# Patient Record
Sex: FEMALE | Race: WHITE | NOT HISPANIC OR LATINO | Employment: UNEMPLOYED | URBAN - METROPOLITAN AREA
[De-identification: names, ages, dates, MRNs, and addresses within clinical notes are randomized per-mention and may not be internally consistent; named-entity substitution may affect disease eponyms.]

---

## 2017-03-15 ENCOUNTER — TRANSFERRED RECORDS (OUTPATIENT)
Dept: HEALTH INFORMATION MANAGEMENT | Facility: CLINIC | Age: 42
End: 2017-03-15

## 2017-03-15 LAB — PAP-ABSTRACT: NORMAL

## 2017-04-03 NOTE — PROGRESS NOTES
SUBJECTIVE:                                                    Rachael Elder is a 42 year old female who presents to clinic today for the following health issues:       HPI      Depression and Anxiety Follow-Up    Status since last visit: Improved     Other associated symptoms:None    Complicating factors:     Significant life event: No     Current substance abuse: None    PHQ-9 SCORE 10/3/2012 4/7/2017   Total Score 5 -   Total Score - 1     ELISA-7 SCORE 10/3/2012 6/29/2015 4/7/2017   Total Score 5 2 -   Total Score - - 2        PHQ-9  English      PHQ-9   Any Language     GAD7       Problem list and histories reviewed & adjusted, as indicated.  Additional history: as documented        Patient Active Problem List   Diagnosis     Phobia, flying     Abnormal LFTs     Past Surgical History:   Procedure Laterality Date     LITHOTRIPSY  4/2009    LASER--Renal stone treaatment -- ?right       Social History   Substance Use Topics     Smoking status: Never Smoker     Smokeless tobacco: Never Used     Alcohol use No     Family History   Problem Relation Age of Onset     DIABETES Mother      Lipids Mother      CANCER Mother      Lung     Coronary Artery Disease No family hx of      Hyperlipidemia No family hx of      Breast Cancer No family hx of      Other Cancer No family hx of      Substance Abuse No family hx of      OSTEOPOROSIS No family hx of      Depression No family hx of      MENTAL ILLNESS No family hx of      Colon Cancer No family hx of      Prostate Cancer No family hx of      CEREBROVASCULAR DISEASE No family hx of      Hypertension No family hx of          BP Readings from Last 3 Encounters:   04/07/17 98/56   10/12/16 100/60   10/06/16 111/71    Wt Readings from Last 3 Encounters:   04/07/17 102 lb (46.3 kg)   10/12/16 101 lb 3.2 oz (45.9 kg)   10/06/16 100 lb (45.4 kg)                  Labs reviewed in EPIC    ROS:  Constitutional, HEENT, cardiovascular, pulmonary, gi and gu systems are negative, except  "as otherwise noted.    OBJECTIVE:                                                    BP 98/56 (BP Location: Left arm, Patient Position: Chair, Cuff Size: Adult Regular)  Pulse 62  Temp 98.2  F (36.8  C) (Oral)  Resp 16  Ht 5' 0.25\" (1.53 m)  Wt 102 lb (46.3 kg)  LMP 04/05/2017 (Approximate)  BMI 19.76 kg/m2  Body mass index is 19.76 kg/(m^2).  GENERAL: healthy, alert and no distress  NECK: no adenopathy, no asymmetry, masses, or scars and thyroid normal to palpation  RESP: lungs clear to auscultation - no rales, rhonchi or wheezes  CV: regular rate and rhythm, normal S1 S2, no S3 or S4, no murmur, click or rub, no peripheral edema and peripheral pulses strong  ABDOMEN: soft, nontender, no hepatosplenomegaly, no masses and bowel sounds normal  MS: no gross musculoskeletal defects noted, no edema  PSYCH: mentation appears normal, affect normal/bright    Diagnostic Test Results:  none      ASSESSMENT/PLAN:                                                      1. Phobia, flying  Planing trip with family that includes flying. Using Klonopin as need for this her last rx outdated.   - clonazePAM (KLONOPIN) 0.5 MG tablet; Take 1 tablet (0.5 mg) by mouth 2 times daily as needed for anxiety  Dispense: 10 tablet; Refill: 0    Discussed with patient that many appointments like this one could be done as an evisit or telephone visit.         See Patient Instructions    ISAAC Peralta [facility]  "

## 2017-04-07 ENCOUNTER — OFFICE VISIT (OUTPATIENT)
Dept: FAMILY MEDICINE | Facility: OTHER | Age: 42
End: 2017-04-07
Payer: COMMERCIAL

## 2017-04-07 VITALS
WEIGHT: 102 LBS | RESPIRATION RATE: 16 BRPM | TEMPERATURE: 98.2 F | BODY MASS INDEX: 20.03 KG/M2 | DIASTOLIC BLOOD PRESSURE: 56 MMHG | SYSTOLIC BLOOD PRESSURE: 98 MMHG | HEIGHT: 60 IN | HEART RATE: 62 BPM

## 2017-04-07 DIAGNOSIS — F40.243 PHOBIA, FLYING: ICD-10-CM

## 2017-04-07 PROCEDURE — 99213 OFFICE O/P EST LOW 20 MIN: CPT | Performed by: NURSE PRACTITIONER

## 2017-04-07 RX ORDER — CLONAZEPAM 0.5 MG/1
0.5 TABLET ORAL 2 TIMES DAILY PRN
Qty: 10 TABLET | Refills: 0 | Status: SHIPPED | OUTPATIENT
Start: 2017-04-07 | End: 2019-02-25

## 2017-04-07 ASSESSMENT — ANXIETY QUESTIONNAIRES
IF YOU CHECKED OFF ANY PROBLEMS ON THIS QUESTIONNAIRE, HOW DIFFICULT HAVE THESE PROBLEMS MADE IT FOR YOU TO DO YOUR WORK, TAKE CARE OF THINGS AT HOME, OR GET ALONG WITH OTHER PEOPLE: NOT DIFFICULT AT ALL
2. NOT BEING ABLE TO STOP OR CONTROL WORRYING: NOT AT ALL
7. FEELING AFRAID AS IF SOMETHING AWFUL MIGHT HAPPEN: NOT AT ALL
6. BECOMING EASILY ANNOYED OR IRRITABLE: NOT AT ALL
3. WORRYING TOO MUCH ABOUT DIFFERENT THINGS: SEVERAL DAYS
GAD7 TOTAL SCORE: 2
5. BEING SO RESTLESS THAT IT IS HARD TO SIT STILL: NOT AT ALL
1. FEELING NERVOUS, ANXIOUS, OR ON EDGE: NOT AT ALL

## 2017-04-07 ASSESSMENT — PATIENT HEALTH QUESTIONNAIRE - PHQ9: 5. POOR APPETITE OR OVEREATING: SEVERAL DAYS

## 2017-04-07 ASSESSMENT — PAIN SCALES - GENERAL: PAINLEVEL: NO PAIN (0)

## 2017-04-07 NOTE — MR AVS SNAPSHOT
After Visit Summary   4/7/2017    Rachael Elder    MRN: [id number]           Patient Information     Date Of Birth          1975        Visit Information        Provider Department      4/7/2017 8:00 AM Lesley Magdaleno APRN CNP [facility]        Today's Diagnoses     Phobia, flying          Care Instructions    Just fyi you can do evisits or telephone visits as well by calling clinic and telling them you would like telephone visit or using My Chart.     Thank you  Lesley Magdaleno CNP          Follow-ups after your visit        Who to contact     If you have questions or need follow up information about today's clinic visit or your schedule please contact [facility] directly at [phone].  Normal or non-critical lab and imaging results will be communicated to you by MyChart, letter or phone within 4 business days after the clinic has received the results. If you do not hear from us within 7 days, please contact the clinic through MyChart or phone. If you have a critical or abnormal lab result, we will notify you by phone as soon as possible.  Submit refill requests through [employer] or call your pharmacy and they will forward the refill request to us. Please allow 3 business days for your refill to be completed.          Additional Information About Your Visit        MyChart Information     [employer] gives you secure access to your electronic health record. If you see a primary care provider, you can also send messages to your care team and make appointments. If you have questions, please call your primary care clinic.  If you do not have a primary care provider, please call [phone] and they will assist you.        Care EveryWhere ID     This is your Care EveryWhere ID. This could be used by other organizations to access your [place] medical records  [id number]        Your Vitals Were     Pulse Temperature Respirations Height Last Period BMI (Body  "Mass Index)    62 98.2  F (36.8  C) (Oral) 16 5' 0.25\" (1.53 m) 04/05/2017 (Approximate) 19.76 kg/m2       Blood Pressure from Last 3 Encounters:   04/07/17 98/56   10/12/16 100/60   10/06/16 111/71    Weight from Last 3 Encounters:   04/07/17 102 lb (46.3 kg)   10/12/16 101 lb 3.2 oz (45.9 kg)   10/06/16 100 lb (45.4 kg)              Today, you had the following     No orders found for display         Where to get your medicines      Some of these will need a paper prescription and others can be bought over the counter.  Ask your nurse if you have questions.     Bring a paper prescription for each of these medications     clonazePAM 0.5 MG tablet          Primary Care Provider Office Phone # Fax #    Shanika ISAAC العراقي [facility] [phone]       [address]  [facility] [address]        Thank you!     Thank you for choosing [facility]  for your care. Our goal is always to provide you with excellent care. Hearing back from our patients is one way we can continue to improve our services. Please take a few minutes to complete the written survey that you may receive in the mail after your visit with us. Thank you!             Your Updated Medication List - Protect others around you: Learn how to safely use, store and throw away your medicines at www.disposemymeds.org.          This list is accurate as of: 4/7/17  8:32 AM.  Always use your most recent med list.                   Brand Name Dispense Instructions for use    clonazePAM 0.5 MG tablet    klonoPIN    10 tablet    Take 1 tablet (0.5 mg) by mouth 2 times daily as needed for anxiety       MULTI-DAY VITAMINS PO      1 TABLET DAILY       PROBIOTIC PO      Take  by mouth daily.       TYLENOL 325 MG tablet   Generic drug:  acetaminophen      Take 1-2 tablets by mouth every 6 hours as needed.         "

## 2017-04-07 NOTE — PATIENT INSTRUCTIONS
Just fyi you can do evisits or telephone visits as well by calling clinic and telling them you would like telephone visit or using My Chart.     Thank you  Lesley Magdaleno CNP

## 2017-04-07 NOTE — NURSING NOTE
"Chief Complaint   Patient presents with     Anxiety     Panel Management     height, tdap       Initial BP 98/56 (BP Location: Left arm, Patient Position: Chair, Cuff Size: Adult Regular)  Pulse 62  Temp 98.2  F (36.8  C) (Oral)  Resp 16  Ht 5' 0.25\" (1.53 m)  Wt 102 lb (46.3 kg)  LMP 04/05/2017 (Approximate)  BMI 19.76 kg/m2 Estimated body mass index is 19.76 kg/(m^2) as calculated from the following:    Height as of this encounter: 5' 0.25\" (1.53 m).    Weight as of this encounter: 102 lb (46.3 kg).  Medication Reconciliation: complete    "

## 2017-04-08 ASSESSMENT — ANXIETY QUESTIONNAIRES: GAD7 TOTAL SCORE: 2

## 2017-04-08 ASSESSMENT — PATIENT HEALTH QUESTIONNAIRE - PHQ9: SUM OF ALL RESPONSES TO PHQ QUESTIONS 1-9: 1

## 2018-01-29 NOTE — PATIENT INSTRUCTIONS
Preventive Health Recommendations  Female Ages 40 to 49    Yearly exam:     See your health care provider every year in order to  1. Review health changes.   2. Discuss preventive care.    3. Review your medicines if your doctor prescribed any.      Get a Pap test every three years (unless you have an abnormal result and your provider advises testing more often).      If you get Pap tests with HPV test, you only need to test every 5 years, unless you have an abnormal result. You do not need a Pap test if your uterus was removed (hysterectomy) and you have not had cancer.      You should be tested each year for STDs (sexually transmitted diseases), if you're at risk.       Ask your doctor if you should have a mammogram.      Have a colonoscopy (test for colon cancer) if someone in your family has had colon cancer or polyps before age 50.       Have a cholesterol test every 5 years.       Have a diabetes test (fasting glucose) after age 45. If you are at risk for diabetes, you should have this test every 3 years.    Shots: Get a flu shot each year. Get a tetanus shot every 10 years.     Nutrition:     Eat at least 5 servings of fruits and vegetables each day.    Eat whole-grain bread, whole-wheat pasta and brown rice instead of white grains and rice.    Talk to your provider about Calcium and Vitamin D.     Lifestyle    Exercise at least 150 minutes a week (an average of 30 minutes a day, 5 days a week). This will help you control your weight and prevent disease.    Limit alcohol to one drink per day.    No smoking.     Wear sunscreen to prevent skin cancer.    See your dentist every six months for an exam and cleaning.    Thank you  Lesley Magdaleno CNP    Understanding Seborrheic Keratosis  Seborrheic keratoses are wart-like growths on the skin. These growths are not cancer. Many people get them, especially after age 30.  How to say it  [id number]   What causes seborrheic keratoses?  Doctors do not  know what causes seborrheic keratoses. They may run in families. In addition, they become more common as people get older.  What are the symptoms of seborrheic keratoses?  Here is what seborrheic keratoses often look like:    They tend to be round or oval in shape. They can be very small or about as big across as a quarter.    They can appear singly or in clusters.    They tend to be tan, brown, or black in color.    The edges may be scalloped or uneven-looking.    They can have a waxy or scaly look.    They can be a bit raised, appearing to be  stuck on  the skin.    They may become red and irritated if scratched or rubbed by clothing  Sebhorreic keratoses are not painful, but they may be itchy. They can become irritated if they are continually rubbed by skin or clothing. Seborrheic keratoses most often appear on the face, arms, chest, back, or belly.  How are seborrheic keratoses treated?  Seborrheic keratoses don t need to be treated unless they bother you. You may choose to have them removed because you find them unattractive. You may also want them removed because they get irritated and uncomfortable. A healthcare provider can remove them in an office visit. Ways that seborrheic keratoses can be removed include:    Freezing them off with liquid nitrogen    Cutting them off with a scalpel    Burning them off with electricity  What are the complications of seborrheic keratoses?  Seborrheic keratoses are not cancer, but they can look like some types of skin cancer. So it s a good idea to ask your healthcare provider to check any new skin growths. Ask your healthcare provider about any skin problem that concerns you.  When should I call my healthcare provider?  Call your healthcare provider right away if any of these occur:    You develop a lot of seborrheic keratoses very quickly    You have a sore that does not heal within a few weeks, or heals and then comes back    You have a mole or skin growth that is  changing in size, shape, or color    You have a mole or skin growth that looks different on one side from the other    You have a mole or skin growth that is not the same color throughout   Date Last Reviewed: 5/1/2016 2000-2017 [employer]. [address] [place] [id number]. All rights reserved. This information is not intended as a substitute for professional medical care. Always follow your healthcare professional's instructions.

## 2018-01-29 NOTE — PROGRESS NOTES
SUBJECTIVE:   CC: Rachael Elder is an 42 year old woman who presents for preventive health visit.     Physical   Annual:     Getting at least 3 servings of Calcium per day::  Yes    Bi-annual eye exam::  Yes    Dental care twice a year::  Yes    Sleep apnea or symptoms of sleep apnea::  None    Diet::  Regular (no restrictions)    Frequency of exercise::  2-3 days/week    Duration of exercise::  15-30 minutes    Taking medications regularly::  Yes    Medication side effects::  Other    Additional concerns today::  YES (check mole on back )            Today's PHQ-2 Score:   PHQ-2 ( 1999 [employer]) 2/4/2018   Q1: Little interest or pleasure in doing things 0   Q2: Feeling down, depressed or hopeless 0   PHQ-2 Score 0   Q1: Little interest or pleasure in doing things Not at all   Q2: Feeling down, depressed or hopeless Not at all   PHQ-2 Score 0       Abuse: Current or Past(Physical, Sexual or Emotional)- No  Do you feel safe in your environment - Yes    Social History   Substance Use Topics     Smoking status: Never Smoker     Smokeless tobacco: Never Used     Alcohol use No     Alcohol Use 2/4/2018   If you drink alcohol, do you typically have greater than 3 drinks per day OR greater than 7 drinks per week?   No       Reviewed orders with patient.  Reviewed health maintenance and updated orders accordingly - Yes  Labs reviewed in EPIC  BP Readings from Last 3 Encounters:   02/05/18 108/62   04/07/17 98/56   10/12/16 100/60    Wt Readings from Last 3 Encounters:   02/05/18 105 lb (47.6 kg)   04/07/17 102 lb (46.3 kg)   10/12/16 101 lb 3.2 oz (45.9 kg)                  Patient Active Problem List   Diagnosis     Phobia, flying     Abnormal LFTs     Past Surgical History:   Procedure Laterality Date     LITHOTRIPSY  4/2009    LASER--Renal stone treaatment -- ?right       Social History   Substance Use Topics     Smoking status: Never Smoker     Smokeless tobacco: Never Used     Alcohol use No     Family History    Problem Relation Age of Onset     DIABETES Mother      Lipids Mother      CANCER Mother      Lung     Coronary Artery Disease No family hx of      Hyperlipidemia No family hx of      Breast Cancer No family hx of      Other Cancer No family hx of      Substance Abuse No family hx of      OSTEOPOROSIS No family hx of      Depression No family hx of      MENTAL ILLNESS No family hx of      Colon Cancer No family hx of      Prostate Cancer No family hx of      CEREBROVASCULAR DISEASE No family hx of      Hypertension No family hx of          Allergies   Allergen Reactions     Codeine Nausea and Vomiting     Macrobid [Nitrofurantoin Monohydrate Macrocrystals]      Rash, Hives     Penicillin [Penicillins]      Rash     Recent Labs   Lab Test  10/12/16   0921  10/06/16   0514  10/05/16   1820   ALT  176*  549*  630*   CR  0.53  0.49*   --    GFRESTIMATED  >90  Non  GFR Calc    >90  Non  GFR Calc     --    GFRESTBLACK  >90   GFR Calc    >90   GFR Calc     --    POTASSIUM  4.2  4.2   --         Patient under age 50, mutual decision reflected in health maintenance.      Pertinent mammograms are reviewed under the imaging tab.    #[id number] - MAMMO SCREEN SHALA  BILATERAL DIGITAL SCREENING MAMMOGRAM 3D/2D WITH CAD: 3/15/2017  Comparison is made to exams dated:  2/26/2015 mammogram and 2/24/2014 mammogram - [facility].      FINDINGS: The tissue of both breasts is heterogeneously dense, which may obscure small masses.     2D and 3D mammography was performed.  Current study was also evaluated with a Computer Aided Detection (CAD) system.    There are benign calcifications right breast.    No significant masses, calcifications, or other findings are seen in either breast.    There has been no significant interval change.    IMPRESSION: BENIGN  There is no mammographic evidence of malignancy.  A 1 year screening mammogram is recommended.       Dr. Vielka HERNANDEZ  Carlitos moreno/zandra:3/15/2017 09:45:06        letter sent: Normal Letter    BI-RADS: 2 Benign    History of abnormal Pap smear: NO - age 30- 65 PAP every 3 years recommended  Last pap results 3/2017  PAP REFLEX HPV (AGE 21 OR OLDER) (03/15/2017)  PAP REFLEX HPV (AGE 21 OR OLDER) (03/15/2017)   Specimen Performing Laboratory   CERVICAL/ENDOCERVICAL [facility]    [address] [place] [address]       PAP REFLEX HPV (AGE 21 OR OLDER) (03/15/2017)   Narrative   CASE: [id number]    PATIENT: DANYEL GUPTA    TEST:  Liquid-based Pap Test with HPV Reflex    SPECIMEN: Cervical/Endocervical; Liquid Based Prep    ASSOCIATED DIAGNOSIS:  Encounter for general adult medical examination    without abnormal findings    LMP:  03/09/2017                    SPECIMEN ADEQUACY:    SATISFACTORY PREPARATION FOR CYTOLOGIC EVALUATION            CYTOLOGIC INTERPRETATION:    NEGATIVE FOR INTRAEPITHELIAL LESION OR MALIGNANCY    ...ENDOMETRIAL CELLS PRESENT IN A WOMAN > / = 40 YEARS OF AGE                                            Final Interpretation performed by    Tamika Gallagher CT(ASCP), SCT(ASCP)    Electronically signed 3/17/2017 9:07:03AM             Reviewed and updated as needed this visit by clinical staff  Tobacco  Allergies  Meds  Problems  Med Hx  Surg Hx  Fam Hx  Soc Hx          Reviewed and updated as needed this visit by Provider  Tobacco  Allergies  Meds  Problems  Med Hx  Surg Hx  Fam Hx  Soc Hx         Past Medical History:   Diagnosis Date     NO ACTIVE PROBLEMS       Past Surgical History:   Procedure Laterality Date     LITHOTRIPSY  4/2009    LASER--Renal stone treaatment -- ?right     Obstetric History     No data available          Review of Systems   Constitutional: Negative for chills and fever.   HENT: Negative for congestion, ear pain, hearing loss and sore throat.    Eyes: Negative for pain and visual disturbance.   Respiratory: Negative for cough and  shortness of breath.    Cardiovascular: Negative for chest pain, palpitations and peripheral edema.   Gastrointestinal: Negative for abdominal pain, constipation, diarrhea, heartburn, hematochezia and nausea.   Genitourinary: Negative for dysuria, frequency, genital sores, hematuria, pelvic pain, urgency, vaginal bleeding and vaginal discharge.   Musculoskeletal: Negative for arthralgias, joint swelling and myalgias.   Skin: Negative for rash.   Neurological: Negative for dizziness, weakness, headaches and paresthesias.   Psychiatric/Behavioral: Negative for mood changes. The patient is not nervous/anxious.           OBJECTIVE:   /62 (BP Location: Left arm, Patient Position: Chair, Cuff Size: Adult Regular)  Pulse 64  Temp 97.7  F (36.5  C) (Oral)  Resp 16  Ht 5' (1.524 m)  Wt 105 lb (47.6 kg)  LMP 01/23/2018 (Approximate)  BMI 20.51 kg/m2  Physical Exam   Constitutional: She is oriented to person, place, and time. She appears well-developed and well-nourished.   HENT:   Head: Normocephalic and atraumatic.   Right Ear: External ear normal.   Left Ear: External ear normal.   Nose: Nose normal.   Mouth/Throat: Oropharynx is clear and moist.   Eyes: Conjunctivae and EOM are normal. Pupils are equal, round, and reactive to light.   Neck: Normal range of motion. Neck supple.   Cardiovascular: Normal rate, regular rhythm, normal heart sounds and intact distal pulses.    Pulmonary/Chest: Effort normal and breath sounds normal.   Abdominal: Soft. Bowel sounds are normal.   Musculoskeletal: Normal range of motion.   Neurological: She is alert and oriented to person, place, and time. She has normal reflexes.   Skin: Skin is dry.   Seborrheic keratosis one spot on back near bra line.    Psychiatric: She has a normal mood and affect. Her behavior is normal. Judgment and thought content normal.         ASSESSMENT/PLAN:   1. Routine general medical examination at a health care facility  Patient states she has had  labs done through her spouses work that was normal lipid and glucose.     2. Encounter for screening mammogram for breast cancer  States she normally has screening done through [facility] last mammo as shown above she states she has history of fibrous breast tissue and therefore typically does 3D imaging  - MA Screen Bilateral w/Cristi; Future    3. Seborrheic keratoses  Arlene seborrheic keratosis and treatment to include removal via blade, cryotherapy, or she may see dermatology for other options.  discussed benign etiology.  Handout given for home.      COUNSELING:  Reviewed preventive health counseling, as reflected in patient instructions       Regular exercise       Healthy diet/nutrition       Vision screening       Osteoporosis Prevention/Bone Health         reports that she has never smoked. She has never used smokeless tobacco.    Estimated body mass index is 20.51 kg/(m^2) as calculated from the following:    Height as of this encounter: 5' (1.524 m).    Weight as of this encounter: 105 lb (47.6 kg).       Counseling Resources:  ATP IV Guidelines  Pooled Cohorts Equation Calculator  Breast Cancer Risk Calculator  FRAX Risk Assessment  ICSI Preventive Guidelines  Dietary Guidelines for Americans, 2010  USDA's MyPlate  ASA Prophylaxis  Lung CA Screening    ISAAC Peralta CNP  [facility]  Answers for HPI/ROS submitted by the patient on 2/4/2018   PHQ-2 Score: 0

## 2018-02-04 ASSESSMENT — ENCOUNTER SYMPTOMS
CONSTIPATION: 0
DYSURIA: 0
EYE PAIN: 0
MYALGIAS: 0
SHORTNESS OF BREATH: 0
JOINT SWELLING: 0
PALPITATIONS: 0
ABDOMINAL PAIN: 0
HEADACHES: 0
FREQUENCY: 0
COUGH: 0
ARTHRALGIAS: 0
SORE THROAT: 0
HEMATOCHEZIA: 0
NAUSEA: 0
DIZZINESS: 0
WEAKNESS: 0
FEVER: 0
NERVOUS/ANXIOUS: 0
DIARRHEA: 0
HEMATURIA: 0
HEARTBURN: 0
CHILLS: 0
PARESTHESIAS: 0

## 2018-02-05 ENCOUNTER — OFFICE VISIT (OUTPATIENT)
Dept: FAMILY MEDICINE | Facility: OTHER | Age: 43
End: 2018-02-05
Payer: COMMERCIAL

## 2018-02-05 VITALS
DIASTOLIC BLOOD PRESSURE: 62 MMHG | HEIGHT: 60 IN | BODY MASS INDEX: 20.62 KG/M2 | WEIGHT: 105 LBS | SYSTOLIC BLOOD PRESSURE: 108 MMHG | TEMPERATURE: 97.7 F | RESPIRATION RATE: 16 BRPM | HEART RATE: 64 BPM

## 2018-02-05 DIAGNOSIS — Z12.31 ENCOUNTER FOR SCREENING MAMMOGRAM FOR BREAST CANCER: ICD-10-CM

## 2018-02-05 DIAGNOSIS — Z00.00 ROUTINE GENERAL MEDICAL EXAMINATION AT A HEALTH CARE FACILITY: Primary | ICD-10-CM

## 2018-02-05 DIAGNOSIS — L82.1 SEBORRHEIC KERATOSES: ICD-10-CM

## 2018-02-05 PROCEDURE — 99396 PREV VISIT EST AGE 40-64: CPT | Performed by: NURSE PRACTITIONER

## 2018-02-05 ASSESSMENT — ENCOUNTER SYMPTOMS
WEAKNESS: 0
SHORTNESS OF BREATH: 0
JOINT SWELLING: 0
MYALGIAS: 0
FEVER: 0
PALPITATIONS: 0
DIZZINESS: 0
HEARTBURN: 0
PARESTHESIAS: 0
COUGH: 0
NAUSEA: 0
CHILLS: 0
SORE THROAT: 0
HEADACHES: 0
NERVOUS/ANXIOUS: 0
DYSURIA: 0
ARTHRALGIAS: 0
ABDOMINAL PAIN: 0
DIARRHEA: 0
HEMATOCHEZIA: 0
EYE PAIN: 0
FREQUENCY: 0
HEMATURIA: 0
CONSTIPATION: 0

## 2018-02-05 NOTE — MR AVS SNAPSHOT
After Visit Summary   2/5/2018    Rachael Elder    MRN: [id number]           Patient Information     Date Of Birth          1975        Visit Information        Provider Department      2/5/2018 5:10 PM Lesley Magdaleno APRN [facility]        Today's Diagnoses     Routine general medical examination at a health care facility    -  1    Encounter for screening mammogram for breast cancer          Care Instructions      Preventive Health Recommendations  Female Ages 40 to 49    Yearly exam:     See your health care provider every year in order to  1. Review health changes.   2. Discuss preventive care.    3. Review your medicines if your doctor prescribed any.      Get a Pap test every three years (unless you have an abnormal result and your provider advises testing more often).      If you get Pap tests with HPV test, you only need to test every 5 years, unless you have an abnormal result. You do not need a Pap test if your uterus was removed (hysterectomy) and you have not had cancer.      You should be tested each year for STDs (sexually transmitted diseases), if you're at risk.       Ask your doctor if you should have a mammogram.      Have a colonoscopy (test for colon cancer) if someone in your family has had colon cancer or polyps before age 50.       Have a cholesterol test every 5 years.       Have a diabetes test (fasting glucose) after age 45. If you are at risk for diabetes, you should have this test every 3 years.    Shots: Get a flu shot each year. Get a tetanus shot every 10 years.     Nutrition:     Eat at least 5 servings of fruits and vegetables each day.    Eat whole-grain bread, whole-wheat pasta and brown rice instead of white grains and rice.    Talk to your provider about Calcium and Vitamin D.     Lifestyle    Exercise at least 150 minutes a week (an average of 30 minutes a day, 5 days a week). This will help you control your weight and prevent  disease.    Limit alcohol to one drink per day.    No smoking.     Wear sunscreen to prevent skin cancer.    See your dentist every six months for an exam and cleaning.    Thank you  Lesley Magdaleno CNP            Follow-ups after your visit        Your next 10 appointments already scheduled     Feb 08, 2018  4:30 PM CST   (Arrive by 4:15 PM)   MA SCREENING DIGITAL BILATERAL with [facility]    [address] [place] [id number]   [phone]           Do not use any powder, lotion or deodorant under your arms or on your breast. If you do, we will ask you to remove it before your exam.  Wear comfortable, two-piece clothing.  If you have any allergies, tell your care team.  Bring any previous mammograms from other facilities or have them mailed to the breast center.              Future tests that were ordered for you today     Open Future Orders        Priority Expected Expires Ordered    MA Screen Bilateral w/Cristi Routine  2/5/2019 2/5/2018            Who to contact     If you have questions or need follow up information about today's clinic visit or your schedule please contact [facility] directly at [phone].  Normal or non-critical lab and imaging results will be communicated to you by MyChart, letter or phone within 4 business days after the clinic has received the results. If you do not hear from us within 7 days, please contact the clinic through [employer] or phone. If you have a critical or abnormal lab result, we will notify you by phone as soon as possible.  Submit refill requests through [employer] or call your pharmacy and they will forward the refill request to us. Please allow 3 business days for your refill to be completed.          Additional Information About Your Visit        [employer] Information     [employer] gives you secure access to your electronic health record. If you see a primary care provider, you can also send  messages to your care team and make appointments. If you have questions, please call your primary care clinic.  If you do not have a primary care provider, please call [phone] and they will assist you.        Care EveryWhere ID     This is your Care EveryWhere ID. This could be used by other organizations to access your [place] medical records  [id number]        Your Vitals Were     Pulse Temperature Respirations Height Last Period BMI (Body Mass Index)    64 97.7  F (36.5  C) (Oral) 16 5' (1.524 m) 01/23/2018 (Approximate) 20.51 kg/m2       Blood Pressure from Last 3 Encounters:   02/05/18 108/62   04/07/17 98/56   10/12/16 100/60    Weight from Last 3 Encounters:   02/05/18 105 lb (47.6 kg)   04/07/17 102 lb (46.3 kg)   10/12/16 101 lb 3.2 oz (45.9 kg)               Primary Care Provider Office Phone # Fax #    Shanika ISAAC العراقي [facility] [phone]       [address] [address] [place] [address]        Equal Access to Services     [facility] Center[facility]IR[facility] AH: Hadii aad ku hadasho Soomaali, waaxda luqadaha, qaybta kaalmada adeegyada, waxay popeye hayherbert garsia . So [facility] [phone].    ATENCIÓN: Si habla español, tiene a lopez disposición servicios gratuitos de asistencia lingüística. Llame al [phone].    We comply with applicable federal civil rights laws and [place] laws. We do not discriminate on the basis of race, color, national origin, age, disability, sex, sexual orientation, or gender identity.            Thank you!     Thank you for choosing [facility]  for your care. Our goal is always to provide you with excellent care. Hearing back from our patients is one way we can continue to improve our services. Please take a few minutes to complete the written survey that you may receive in the mail after your visit with us. Thank you!             Your Updated Medication List - Protect others around you: Learn how to safely use, store and throw away your medicines at  www.disposemymeds.org.          This list is accurate as of 2/5/18  6:02 PM.  Always use your most recent med list.                   Brand Name Dispense Instructions for use Diagnosis    clonazePAM 0.5 MG tablet    klonoPIN    10 tablet    Take 1 tablet (0.5 mg) by mouth 2 times daily as needed for anxiety    Phobia, flying       MULTI-DAY VITAMINS PO      1 TABLET DAILY        PROBIOTIC PO      Take  by mouth daily.        TYLENOL 325 MG tablet   Generic drug:  acetaminophen      Take 1-2 tablets by mouth every 6 hours as needed.

## 2018-02-05 NOTE — NURSING NOTE
Chief Complaint   Patient presents with     Physical     Panel Management     height, flu, tdap, pap       Initial /62 (BP Location: Left arm, Patient Position: Chair, Cuff Size: Adult Regular)  Pulse 64  Temp 97.7  F (36.5  C) (Oral)  Resp 16  Ht 5' (1.524 m)  Wt 105 lb (47.6 kg)  LMP 01/23/2018 (Approximate)  BMI 20.51 kg/m2 Estimated body mass index is 20.51 kg/(m^2) as calculated from the following:    Height as of this encounter: 5' (1.524 m).    Weight as of this encounter: 105 lb (47.6 kg).  Medication Reconciliation: complete

## 2018-02-06 ENCOUNTER — TELEPHONE (OUTPATIENT)
Dept: FAMILY MEDICINE | Facility: OTHER | Age: 43
End: 2018-02-06

## 2018-02-06 NOTE — TELEPHONE ENCOUNTER
Reason for Call: Request for an order or referral:    Order or referral being requested: mammogram /delfina    Date needed: as soon as possible    Has the patient been seen by the PCP for this problem? YES    Additional comments: please call [facility] with mammogram regarding order and appt Friday. They do not have delfina on the truck.    Phone number Patient can be reached at:  Concha Jernigan [phone]    Best Time:  any    Can we leave a detailed message on this number?  YES    Call taken on 2/6/2018 at 8:55 AM by Nicole Becerra

## 2018-02-06 NOTE — TELEPHONE ENCOUNTER
Left message for patient to return call. Please see message below. Pt was seen by WS on 2/5 and mammo appointment was made, but the type of mammo ordered is not available on the mammo truck. Patient will need to go to [place] or [place] to get mammogram with delfina done.

## 2018-02-07 ENCOUNTER — RADIANT APPOINTMENT (OUTPATIENT)
Dept: MAMMOGRAPHY | Facility: CLINIC | Age: 43
End: 2018-02-07
Attending: NURSE PRACTITIONER
Payer: COMMERCIAL

## 2018-02-07 DIAGNOSIS — Z12.31 ENCOUNTER FOR SCREENING MAMMOGRAM FOR BREAST CANCER: ICD-10-CM

## 2018-02-07 PROCEDURE — 77063 BREAST TOMOSYNTHESIS BI: CPT | Performed by: RADIOLOGY

## 2018-02-07 PROCEDURE — 77067 SCR MAMMO BI INCL CAD: CPT | Performed by: RADIOLOGY

## 2018-06-28 ENCOUNTER — ALLIED HEALTH/NURSE VISIT (OUTPATIENT)
Dept: FAMILY MEDICINE | Facility: OTHER | Age: 43
End: 2018-06-28
Payer: COMMERCIAL

## 2018-06-28 DIAGNOSIS — Z23 NEED FOR TDAP VACCINATION: Primary | ICD-10-CM

## 2018-06-28 PROCEDURE — 90471 IMMUNIZATION ADMIN: CPT

## 2018-06-28 PROCEDURE — 90715 TDAP VACCINE 7 YRS/> IM: CPT

## 2018-06-28 PROCEDURE — 99207 ZZC NO CHARGE NURSE ONLY: CPT

## 2018-06-28 NOTE — NURSING NOTE
Screening Questionnaire for Adult Immunization    Are you sick today?   No   Do you have allergies to medications, food, a vaccine component or latex?   PCN only   Have you ever had a serious reaction after receiving a vaccination?   No   Do you have a long-term health problem with heart disease, lung disease, asthma, kidney disease, metabolic disease (e.g. diabetes), anemia, or other blood disorder?   No   Do you have cancer, leukemia, HIV/AIDS, or any other immune system problem?   No   In the past 3 months, have you taken medications that affect  your immune system, such as prednisone, other steroids, or anticancer drugs; drugs for the treatment of rheumatoid arthritis, Crohn s disease, or psoriasis; or have you had radiation treatments?   No   Have you had a seizure, or a brain or other nervous system problem?   No   During the past year, have you received a transfusion of blood or blood     products, or been given immune (gamma) globulin or antiviral drug?   No   For women: Are you pregnant or is there a chance you could become        pregnant during the next month?   No   Have you received any vaccinations in the past 4 weeks?   No     Immunization questionnaire was positive for at least one answer.  Notified Shanika Prince.        Per orders of Shanika Prince, injection of Tdap given by Jenn Christian. Patient instructed to remain in clinic for 15 minutes afterwards, and to report any adverse reaction to me immediately.    Prior to injection verified patient identity using patient's name and date of birth.     Screening performed by Jenn Christian on 6/28/2018 at 9:38 AM.

## 2018-06-28 NOTE — MR AVS SNAPSHOT
After Visit Summary   6/28/2018    Rachael Elder    MRN: [id number]           Patient Information     Date Of Birth          1975        Visit Information        Provider Department      6/28/2018 9:30 AM ADRIANA BENÍTEZ TEAM B, [facility]        Today's Diagnoses     Need for Tdap vaccination    -  1       Follow-ups after your visit        Who to contact     If you have questions or need follow up information about today's clinic visit or your schedule please contact [facility] directly at [phone].  Normal or non-critical lab and imaging results will be communicated to you by [employer], letter or phone within 4 business days after the clinic has received the results. If you do not hear from us within 7 days, please contact the clinic through [employer] or phone. If you have a critical or abnormal lab result, we will notify you by phone as soon as possible.  Submit refill requests through [employer] or call your pharmacy and they will forward the refill request to us. Please allow 3 business days for your refill to be completed.          Additional Information About Your Visit        MyChart Information     [employer] gives you secure access to your electronic health record. If you see a primary care provider, you can also send messages to your care team and make appointments. If you have questions, please call your primary care clinic.  If you do not have a primary care provider, please call [phone] and they will assist you.        Care EveryWhere ID     This is your Care EveryWhere ID. This could be used by other organizations to access your [place] medical records  [id number]         Blood Pressure from Last 3 Encounters:   02/05/18 108/62   04/07/17 98/56   10/12/16 100/60    Weight from Last 3 Encounters:   02/05/18 105 lb (47.6 kg)   04/07/17 102 lb (46.3 kg)   10/12/16 101 lb 3.2 oz (45.9 kg)              We Performed the Following     ADMIN 1st VACCINE      TDAP, IM (10 - 64 YRS) - Adacel        Primary Care Provider Office Phone # Fax #    ISAAC Lewis [facility] [phone] [address] [address] [place] [address]        Equal Access to Services     WESTLEY MCNEILL : Hadii aad ku hadasho Soomaali, waaxda luqadaha, qaybta kaalmada adeegyada, waxay idiin hayaan adeeg khariannash laheron . So [facility] [phone].    ATENCIÓN: Si habla español, tiene a lopez disposición servicios gratuitos de asistencia lingüística. Llame al [phone].    We comply with applicable federal civil rights laws and [place] laws. We do not discriminate on the basis of race, color, national origin, age, disability, sex, sexual orientation, or gender identity.            Thank you!     Thank you for choosing [facility]  for your care. Our goal is always to provide you with excellent care. Hearing back from our patients is one way we can continue to improve our services. Please take a few minutes to complete the written survey that you may receive in the mail after your visit with us. Thank you!             Your Updated Medication List - Protect others around you: Learn how to safely use, store and throw away your medicines at www.disposemymeds.org.          This list is accurate as of 6/28/18  9:42 AM.  Always use your most recent med list.                   Brand Name Dispense Instructions for use Diagnosis    clonazePAM 0.5 MG tablet    klonoPIN    10 tablet    Take 1 tablet (0.5 mg) by mouth 2 times daily as needed for anxiety    Phobia, flying       MULTI-DAY VITAMINS PO      1 TABLET DAILY        PROBIOTIC PO      Take  by mouth daily.        TYLENOL 325 MG tablet   Generic drug:  acetaminophen      Take 1-2 tablets by mouth every 6 hours as needed.

## 2019-02-18 ENCOUNTER — ANCILLARY PROCEDURE (OUTPATIENT)
Dept: MAMMOGRAPHY | Facility: CLINIC | Age: 44
End: 2019-02-18
Payer: COMMERCIAL

## 2019-02-18 DIAGNOSIS — Z12.31 VISIT FOR SCREENING MAMMOGRAM: ICD-10-CM

## 2019-02-18 PROCEDURE — 77063 BREAST TOMOSYNTHESIS BI: CPT | Performed by: STUDENT IN AN ORGANIZED HEALTH CARE EDUCATION/TRAINING PROGRAM

## 2019-02-18 PROCEDURE — 77067 SCR MAMMO BI INCL CAD: CPT | Performed by: STUDENT IN AN ORGANIZED HEALTH CARE EDUCATION/TRAINING PROGRAM

## 2019-02-25 ENCOUNTER — OFFICE VISIT (OUTPATIENT)
Dept: OBGYN | Facility: OTHER | Age: 44
End: 2019-02-25
Payer: COMMERCIAL

## 2019-02-25 VITALS
DIASTOLIC BLOOD PRESSURE: 68 MMHG | HEIGHT: 60 IN | HEART RATE: 78 BPM | BODY MASS INDEX: 20.71 KG/M2 | SYSTOLIC BLOOD PRESSURE: 98 MMHG | WEIGHT: 105.5 LBS

## 2019-02-25 DIAGNOSIS — Z00.00 ANNUAL PHYSICAL EXAM: Primary | ICD-10-CM

## 2019-02-25 DIAGNOSIS — F40.243 PHOBIA, FLYING: ICD-10-CM

## 2019-02-25 PROCEDURE — 99396 PREV VISIT EST AGE 40-64: CPT | Performed by: OBSTETRICS & GYNECOLOGY

## 2019-02-25 RX ORDER — CLONAZEPAM 0.5 MG/1
0.5 TABLET ORAL 2 TIMES DAILY PRN
Qty: 10 TABLET | Refills: 0 | Status: SHIPPED | OUTPATIENT
Start: 2019-02-25 | End: 2022-04-05

## 2019-02-25 ASSESSMENT — PATIENT HEALTH QUESTIONNAIRE - PHQ9: SUM OF ALL RESPONSES TO PHQ QUESTIONS 1-9: 0

## 2019-02-25 ASSESSMENT — MIFFLIN-ST. JEOR: SCORE: 1043.17

## 2019-02-25 NOTE — PROGRESS NOTES
Chief Complaint   Patient presents with     Physical     Pap       Subjective  Rachael Elder is a 44 year old female who presents for her annual exam.  Patient states she is doing well.  Her last menstrual period was 2/7.  Her menses are regular, monthly.  She bleeds for 7 days.  Patient uses pads and rarely has to change them.  No problems urinating.  Normal bowel movements.  Patient is sexually active.  No dyspareunia.  No vaginal spotting after.  3 NSVDs.  Patient already received her flu vaccine.  Her  has had a vasectomy.    Most recent pap: 2017  History of abnormal Pap smear:  No  History of STI's:  No  History of PID:  No    Family history of uterine cancer:  No  Family history of ovarian cancer: No  Family history of colon cancer:  No  Family history of breast cancer:  No    ROS  ROS: 10 point ROS neg other than the symptoms noted above in the HPI.    Past Medical History:   Diagnosis Date     NO ACTIVE PROBLEMS      Past Surgical History:   Procedure Laterality Date     LITHOTRIPSY  4/2009    LASER--Renal stone treaatment -- ?right     Family History   Problem Relation Age of Onset     Diabetes Mother      Lipids Mother      Cancer Mother         Lung     Coronary Artery Disease No family hx of      Hyperlipidemia No family hx of      Breast Cancer No family hx of      Other Cancer No family hx of      Substance Abuse No family hx of      Osteoporosis No family hx of      Depression No family hx of      Mental Illness No family hx of      Colon Cancer No family hx of      Prostate Cancer No family hx of      Cerebrovascular Disease No family hx of      Hypertension No family hx of      Social History     Tobacco Use     Smoking status: Never Smoker     Smokeless tobacco: Never Used   Substance Use Topics     Alcohol use: No       Tobacco abuse:  No  Do you get at least three servings of calcium containing foods daily (dairy, green leafy vegetables, etc.)? yes   Outside of work or daily activities,  "how many days per week do you exercise for 30 minutes or longer? 3-4x per week  The patient does not drink >3 drinks per day nor >7 drinks per week.   Have you had an eye exam in the past two years? yes   Do you see a dentist twice per year? yes   Today's PHQ-2 Score:   Abuse: Current or Past(Physical, Sexual or Emotional)- No   Do you feel safe in your environment - Yes  Objective  Vitals: BP 98/68   Pulse 78   Ht 1.521 m (4' 11.88\")   Wt 47.9 kg (105 lb 8 oz)   LMP 02/08/2019 (Approximate)   BMI 20.69 kg/m    BMI= Body mass index is 20.69 kg/m .    General appearance=well developed, well-nourished female  Psych=mood is stable  Skin=no rashes or lesions seen  Breast:  Benign exam, no masses palpated.  No skin changes, no axillary lymphadenopathy, no nipple discharge.  Axilla feel completely normal, no lymph node enlargement and non-tender.  Neck=overall appearance is normal  Heart=RRR, no murmurs, no swelling noted  Thyroid=normal, no masses, no TTP, no enlargement  Lungs=non-labored breathing, no use of accessory muscles, clear to ausculation bilaterally  Abd=soft, Nontender/nondistended, +bowel sounds x4, no masses, no signs of hernias, no evidence of hepatosplenomegaly  PELVIC:    External genitalia: normal without lesions or masses  Urethral meatus: no lesions or prolapse noted, normal size  Urethra: no masses, non tender  Bladder: non tender, no fullness  Vagina: normal mucosa and rugae, no discharge.  Cervix: normal without lesion, no cervical motion tenderness, healthy, multiparous  Uterus: small, mobile, nontender.  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    25 minutes was spent face to face with the patient today discussing her history, diagnosis, and follow-up plan as noted above.  Over 50% of the visit was spent in counseling and coordination of care.    Total Visit Time: 30 minutes        Martha Garcia

## 2019-02-25 NOTE — PATIENT INSTRUCTIONS
PREVENTIVE HEALTH RECOMMENDATIONS:   Get a physical every year.  A pap test is important to have done starting at age 21 and then every three years as long as your pap is normal.  When you receive the results of your pap test it will include when you need to have your next pap test.    You should be tested each year for chlamydia and gonorrhea if you are aged 16-25 and if you have had a new sexual partner since you were last tested.   Vaccines: Get a flu shot each year.   Eat at least 8-10 servings of fruits and vegetables daily.  Eat whole-grain bread and cereal, whole-wheat pasta and brown rice instead of white grains and white rice.   For bone health: Eat calcium-rich foods (dairy products) or take calcium pills (500 to 600 mg with vitamin D) twice a day with food.   Exercise for an average of 30 minutes a day, 5 days of the week. It can be as simple as taking a brisk walk.  This will help you control your weight and prevent many diseases.   Limit alcohol to one drink per day.   Don't smoke and limit your exposure to second hand smoke.  If you smoke consider making a plan to quit. Go to [employer] and clink on   [employer]  for help   Wear sunscreen with at least SPF15 to prevent skin damage and skin cancer.   Brush your teeth twice a day and floss once a day and see your dentist twice a year for an exam and cleaning.   Have a great year and I will look forward to seeing you next year.   Martha Garcia, DO

## 2020-02-17 ENCOUNTER — ANCILLARY PROCEDURE (OUTPATIENT)
Dept: MAMMOGRAPHY | Facility: CLINIC | Age: 45
End: 2020-02-17
Payer: COMMERCIAL

## 2020-02-17 DIAGNOSIS — Z00.00 ANNUAL PHYSICAL EXAM: ICD-10-CM

## 2020-02-17 PROCEDURE — 77067 SCR MAMMO BI INCL CAD: CPT | Performed by: STUDENT IN AN ORGANIZED HEALTH CARE EDUCATION/TRAINING PROGRAM

## 2020-02-17 PROCEDURE — 77063 BREAST TOMOSYNTHESIS BI: CPT | Performed by: STUDENT IN AN ORGANIZED HEALTH CARE EDUCATION/TRAINING PROGRAM

## 2020-02-24 ENCOUNTER — OFFICE VISIT (OUTPATIENT)
Dept: OBGYN | Facility: OTHER | Age: 45
End: 2020-02-24
Payer: COMMERCIAL

## 2020-02-24 ENCOUNTER — MYC REFILL (OUTPATIENT)
Dept: OBGYN | Facility: OTHER | Age: 45
End: 2020-02-24

## 2020-02-24 VITALS
BODY MASS INDEX: 21.89 KG/M2 | HEART RATE: 82 BPM | DIASTOLIC BLOOD PRESSURE: 64 MMHG | WEIGHT: 111.5 LBS | HEIGHT: 60 IN | SYSTOLIC BLOOD PRESSURE: 100 MMHG

## 2020-02-24 DIAGNOSIS — F40.243 PHOBIA, FLYING: ICD-10-CM

## 2020-02-24 DIAGNOSIS — Z00.00 ANNUAL PHYSICAL EXAM: Primary | ICD-10-CM

## 2020-02-24 PROCEDURE — 87624 HPV HI-RISK TYP POOLED RSLT: CPT | Performed by: OBSTETRICS & GYNECOLOGY

## 2020-02-24 PROCEDURE — 99396 PREV VISIT EST AGE 40-64: CPT | Performed by: OBSTETRICS & GYNECOLOGY

## 2020-02-24 PROCEDURE — G0145 SCR C/V CYTO,THINLAYER,RESCR: HCPCS | Performed by: OBSTETRICS & GYNECOLOGY

## 2020-02-24 RX ORDER — CLONAZEPAM 0.5 MG/1
0.5 TABLET ORAL 2 TIMES DAILY PRN
Qty: 10 TABLET | Refills: 0 | Status: CANCELLED | OUTPATIENT
Start: 2020-02-24

## 2020-02-24 ASSESSMENT — MIFFLIN-ST. JEOR: SCORE: 1067.26

## 2020-02-24 NOTE — NURSING NOTE
Chief Complaint   Patient presents with     Physical       Initial /64 (BP Location: Left arm, Cuff Size: Adult Regular)   Pulse 82   Ht 1.524 m (5')   Wt 50.6 kg (111 lb 8 oz)   LMP 2020   BMI 21.78 kg/m   Estimated body mass index is 21.78 kg/m  as calculated from the following:    Height as of this encounter: 1.524 m (5').    Weight as of this encounter: 50.6 kg (111 lb 8 oz).  BP completed using cuff size: regular        The following HM Due: pap smear due soon      The following patient reported/Care Every where data was sent to:  P ABSTRACT QUALITY INITIATIVES [[id number]]          Jaquelin Johnston MA on 2020 at 4:06 PM

## 2020-02-24 NOTE — PATIENT INSTRUCTIONS
PREVENTIVE HEALTH RECOMMENDATIONS:   Get a physical every year.  A pap test is important to have done starting at age 21 and then every three years as long as your pap is normal.  When you receive the results of your pap test it will include when you need to have your next pap test.    You should be tested each year for chlamydia and gonorrhea if you are aged 16-25 and if you have had a new sexual partner since you were last tested.   Vaccines: Get a flu shot each year.   Eat at least 8-10 servings of fruits and vegetables daily.  Eat whole-grain bread and cereal, whole-wheat pasta and brown rice instead of white grains and white rice.   For bone health: Eat calcium-rich foods (dairy products) or take calcium pills (500 to 600 mg with vitamin D) twice a day with food.   Exercise for an average of 30 minutes a day, 5 days of the week. It can be as simple as taking a brisk walk.  This will help you control your weight and prevent many diseases.   Limit alcohol to one drink per day.   Don't smoke and limit your exposure to second hand smoke.  If you smoke consider making a plan to quit. Go to [employer] and clink on   [employer]  for help   Wear sunscreen with at least SPF15 to prevent skin damage and skin cancer.   Brush your teeth twice a day and floss once a day and see your dentist twice a year for an exam and cleaning.   Have a great year and I will look forward to seeing you next year.   Martha Garcia, DO

## 2020-02-24 NOTE — PROGRESS NOTES
Chief Complaint   Patient presents with     Physical       Subjective  Rachael Elder is a 45 year old female who presents for her annual exam.  Patient states she is doing well.  Her last menstrual period was last Tuesday.  Her menses are regular, monthly.  She bleeds for 6-7 days.  Patient uses pads and rarely has to change them.  No problems urinating.  Normal bowel movements.  Patient is sexually active.  No dyspareunia.  No vaginal spotting after.  3 NSVDs.  Patient already received her flu vaccine.  Her  has had a vasectomy.    Most recent pap:  2017  History of abnormal Pap smear:  No  History of STI's:  No  History of PID:  No    Family history of uterine cancer:  No  Family history of ovarian cancer:  No  Family history of colon cancer:   No  Family history of breast cancer:  No    ROS  ROS: 10 point ROS neg other than the symptoms noted above in the HPI.    Past Medical History:   Diagnosis Date     NO ACTIVE PROBLEMS      Past Surgical History:   Procedure Laterality Date     LITHOTRIPSY  4/2009    LASER--Renal stone treaatment -- ?right     Family History   Problem Relation Age of Onset     Diabetes Mother      Lipids Mother      Cancer Mother         Lung     Coronary Artery Disease No family hx of      Hyperlipidemia No family hx of      Breast Cancer No family hx of      Other Cancer No family hx of      Substance Abuse No family hx of      Osteoporosis No family hx of      Depression No family hx of      Mental Illness No family hx of      Colon Cancer No family hx of      Prostate Cancer No family hx of      Cerebrovascular Disease No family hx of      Hypertension No family hx of      Social History     Tobacco Use     Smoking status: Never Smoker     Smokeless tobacco: Never Used   Substance Use Topics     Alcohol use: No       Tobacco abuse:  No  Do you get at least three servings of calcium containing foods daily (dairy, green leafy vegetables, etc.)? yes   Outside of work or daily  activities, how many days per week do you exercise for 30 minutes or longer? 3-4x per week  The patient does not drink >3 drinks per day nor >7 drinks per week.   Have you had an eye exam in the past two years? yes   Do you see a dentist twice per year? yes   Today's PHQ-2 Score:   Abuse: Current or Past(Physical, Sexual or Emotional)- No   Do you feel safe in your environment - Yes  Objective  Vitals: /64 (BP Location: Left arm, Cuff Size: Adult Regular)   Pulse 82   Ht 1.524 m (5')   Wt 50.6 kg (111 lb 8 oz)   LMP 02/18/2020   BMI 21.78 kg/m    BMI= Body mass index is 21.78 kg/m .    General appearance=well developed, well-nourished female  Gait=normal  Psych=mood is stable, alert and oriented x3  HEENT=mucous membranes moist  Skin=no rashes or lesions seen,normal turgor   Breast:  Benign exam, no masses palpated.  No skin changes, no axillary lymphadenopathy, no nipple discharge.  Axilla feel completely normal, no lymph node enlargement and non-tender.  Neck=overall appearance is normal  Heart=RRR, no murmurs, no swelling noted  Thyroid=normal, no masses, no TTP, no enlargement  Lungs=non-labored breathing, no use of accessory muscles, clear to ausculation bilaterally  Abd=soft, Nontender/nondistended, +bowel sounds x4, no masses, no signs of hernias, no evidence of hepatosplenomegaly  PELVIC:    External genitalia: normal without lesions or masses  Urethral meatus: no lesions or prolapse noted, normal size  Urethra: no masses, non tender  Bladder: non tender, no fullness  Vagina: normal mucosa and rugae, no discharge.  Cervix: normal without lesion, no cervical motion tenderness, healthy, multiparous, pap smear obtained  Uterus: small, mobile, nontender.  Adnexa: non tender, without masses  Rectal: deferred  Ext=no clubbing or cyanosis, no swelling      Last lipid profile:  Done for  work wellness program  Regular self breast exam:  No  Most recent mammogram:  This month  History of abnormal  mammogram:  No  Fit testing:  N/A  DEXA:  N/A        Assessment/Plan  1.)  Annual/well woman exam=send [place] lipid results, pap smear      The following topics were discussed or recommended   Discussed seat belt, helmet and sunscreen use  Vision screening   Mammogram   Calcium/Vitamin D supplement=Recommended 1000 mg of calcium daily and 800 IU of vitamin D.    25 minutes was spent face to face with the patient today discussing her history, diagnosis, and follow-up plan as noted above.  Over 50% of the visit was spent in counseling and coordination of care.    Total Visit Time: 30 minutes        Martha Garcia DO

## 2020-02-25 NOTE — TELEPHONE ENCOUNTER
Pending Prescriptions:                       Disp   Refills    clonazePAM (KLONOPIN) 0.5 MG tablet       10 tab*0            Sig: Take 1 tablet (0.5 mg) by mouth 2 times daily as           needed for anxiety    Pati Singh, MSN, RN

## 2020-02-27 LAB
COPATH REPORT: NORMAL
PAP: NORMAL

## 2020-03-01 LAB
FINAL DIAGNOSIS: NORMAL
HPV HR 12 DNA CVX QL NAA+PROBE: NEGATIVE
HPV16 DNA SPEC QL NAA+PROBE: NEGATIVE
HPV18 DNA SPEC QL NAA+PROBE: NEGATIVE
SPECIMEN DESCRIPTION: NORMAL
SPECIMEN SOURCE CVX/VAG CYTO: NORMAL

## 2020-03-06 NOTE — TELEPHONE ENCOUNTER
After reviewing the note prior it was documented that this refill was a duplicate.  Patient is stating that Anthony never received this prescription.  Upon reviewing the actual prescription it looks as though it was sent electronically but then it states that it was a local print.  Dr. Garcia was this ever faxed to patients pharmacy?  This wouldn't have gone over electronically when it states local print.  Advised patient that this will be sent to Dr. Garcia on Monday when she is back in clinic.  Patient verbalized understanding.  Jaquelin Concepcion CMA  March 6, 2020 5:26 PM

## 2020-03-09 RX ORDER — CLONAZEPAM 0.5 MG/1
0.5 TABLET ORAL 2 TIMES DAILY PRN
Qty: 10 TABLET | Refills: 0 | Status: SHIPPED | OUTPATIENT
Start: 2020-03-09 | End: 2021-03-01

## 2020-03-12 ENCOUNTER — MYC MEDICAL ADVICE (OUTPATIENT)
Dept: OBGYN | Facility: OTHER | Age: 45
End: 2020-03-12

## 2020-05-21 ENCOUNTER — NURSE TRIAGE (OUTPATIENT)
Dept: NURSING | Facility: CLINIC | Age: 45
End: 2020-05-21

## 2020-05-21 DIAGNOSIS — Z11.59 SCREENING FOR VIRAL DISEASE: ICD-10-CM

## 2020-05-21 DIAGNOSIS — Z11.59 SCREENING FOR VIRAL DISEASE: Primary | ICD-10-CM

## 2020-05-21 PROCEDURE — 86769 SARS-COV-2 COVID-19 ANTIBODY: CPT | Mod: 90 | Performed by: EMERGENCY MEDICINE

## 2020-05-21 PROCEDURE — 99000 SPECIMEN HANDLING OFFICE-LAB: CPT | Performed by: EMERGENCY MEDICINE

## 2020-05-21 PROCEDURE — 36415 COLL VENOUS BLD VENIPUNCTURE: CPT | Performed by: EMERGENCY MEDICINE

## 2020-05-21 NOTE — TELEPHONE ENCOUNTER
"Shannon called asking to have covid serology testing done.      Patient is calling requesting COVID serologic antibody testing.  NOTE: Serologic testing is a blood test for 'antibodies' which are made at 10-14 days after you have had symptoms of COVID or were exposed and had an asymptomatic infection.  This does NOT test you for 'active' infection or tell you if you are contagious.    Are you a healthcare worker?  Yes  Do you work for  [employer]?   No  Do you currently have a cough, fever, body aches, shortness of breath, or difficulty breathing?  No  Have you been exposed to (or come into close contact with) someone who tested POSITIVE for COVID-19 or someone who had a possible case of COVID-19?  Possible exposure >14 days ago.  Possible exposure > 14 days ago.      The patient was informed: \"Testing is limited each day and it may take time for testing to be available to everyone who has called.  We will be calling you to schedule testing- please confirm the best number to reach you is [phone].\"    Lab order placed per SARS-CoV-2 Serology test Standing Order.        Elissa AMOS RN [facility]       "

## 2020-05-22 LAB
COVID-19 SPIKE RBD ABY TITER: NORMAL
COVID-19 SPIKE RBD ABY: POSITIVE

## 2020-12-06 ENCOUNTER — HEALTH MAINTENANCE LETTER (OUTPATIENT)
Age: 45
End: 2020-12-06

## 2021-02-22 ENCOUNTER — ANCILLARY PROCEDURE (OUTPATIENT)
Dept: MAMMOGRAPHY | Facility: CLINIC | Age: 46
End: 2021-02-22
Payer: COMMERCIAL

## 2021-02-22 DIAGNOSIS — Z12.31 VISIT FOR SCREENING MAMMOGRAM: ICD-10-CM

## 2021-02-22 PROCEDURE — 77063 BREAST TOMOSYNTHESIS BI: CPT | Mod: GC | Performed by: RADIOLOGY

## 2021-02-22 PROCEDURE — 77067 SCR MAMMO BI INCL CAD: CPT | Mod: GC | Performed by: RADIOLOGY

## 2021-03-01 ENCOUNTER — OFFICE VISIT (OUTPATIENT)
Dept: OBGYN | Facility: OTHER | Age: 46
End: 2021-03-01
Payer: COMMERCIAL

## 2021-03-01 VITALS
DIASTOLIC BLOOD PRESSURE: 66 MMHG | HEIGHT: 60 IN | BODY MASS INDEX: 22.19 KG/M2 | WEIGHT: 113 LBS | SYSTOLIC BLOOD PRESSURE: 110 MMHG

## 2021-03-01 DIAGNOSIS — F40.243 PHOBIA, FLYING: ICD-10-CM

## 2021-03-01 DIAGNOSIS — Z00.00 ANNUAL PHYSICAL EXAM: Primary | ICD-10-CM

## 2021-03-01 PROCEDURE — 99396 PREV VISIT EST AGE 40-64: CPT | Performed by: OBSTETRICS & GYNECOLOGY

## 2021-03-01 RX ORDER — CLONAZEPAM 0.5 MG/1
0.5 TABLET ORAL 2 TIMES DAILY PRN
Qty: 10 TABLET | Refills: 0 | Status: SHIPPED | OUTPATIENT
Start: 2021-03-01 | End: 2022-02-07

## 2021-03-01 ASSESSMENT — MIFFLIN-ST. JEOR: SCORE: 1063.44

## 2021-03-01 NOTE — PATIENT INSTRUCTIONS
PREVENTIVE HEALTH RECOMMENDATIONS:   Get a physical every year.  A pap test is important to have done starting at age 21 and then every three years as long as your pap is normal.  When you receive the results of your pap test it will include when you need to have your next pap test.    You should be tested each year for chlamydia and gonorrhea if you are aged 16-25 and if you have had a new sexual partner since you were last tested.   Vaccines: Get a flu shot each year.   Eat at least 8-10 servings of fruits and vegetables daily.  Eat whole-grain bread and cereal, whole-wheat pasta and brown rice instead of white grains and white rice.   For bone health: Eat calcium-rich foods (dairy products) or take calcium pills (500 to 600 mg with vitamin D) twice a day with food.   Exercise for an average of 30 minutes a day, 5 days of the week. It can be as simple as taking a brisk walk.  This will help you control your weight and prevent many diseases.   Limit alcohol to one drink per day.   Don't smoke and limit your exposure to second hand smoke.  If you smoke consider making a plan to quit. Go to [employer] and clink on   [employer]  for help   Wear sunscreen with at least SPF15 to prevent skin damage and skin cancer.   Brush your teeth twice a day and floss once a day and see your dentist twice a year for an exam and cleaning.   Have a great year and I will look forward to seeing you next year.   Martha Garcia, DO

## 2021-03-01 NOTE — PROGRESS NOTES
Chief Complaint   Patient presents with     Physical       Subjective  Rachael Elder is a 46 year old female who presents for her annual exam.  Patient states she is doing well.  Her last menstrual period was 2/17.  Her menses are regular, monthly.  She bleeds for 6-7 days.  Patient uses pads and rarely has to change them.  3 days are heavy.  No problems urinating.  Normal bowel movements but more frequent lately.  No change in diet.  Patient is sexually active.  No dyspareunia.  No vaginal spotting after.  3 NSVDs.  Her  has had a vasectomy.    Most recent pap: 2020  History of abnormal Pap smear: No  History of STI's:  No  History of PID:  No    Family history of uterine cancer:  No  Family history of ovarian cancer:  No  Family history of colon cancer:   No  Family history of breast cancer:  No    ROS  ROS: 10 point ROS neg other than the symptoms noted above in the HPI.    Past Medical History:   Diagnosis Date     NO ACTIVE PROBLEMS      Past Surgical History:   Procedure Laterality Date     LITHOTRIPSY  4/2009    LASER--Renal stone treaatment -- ?right     Family History   Problem Relation Age of Onset     Diabetes Mother      Lipids Mother      Cancer Mother         Lung     Coronary Artery Disease No family hx of      Hyperlipidemia No family hx of      Breast Cancer No family hx of      Other Cancer No family hx of      Substance Abuse No family hx of      Osteoporosis No family hx of      Depression No family hx of      Mental Illness No family hx of      Colon Cancer No family hx of      Prostate Cancer No family hx of      Cerebrovascular Disease No family hx of      Hypertension No family hx of      Social History     Tobacco Use     Smoking status: Never Smoker     Smokeless tobacco: Never Used   Substance Use Topics     Alcohol use: No       Tobacco abuse:  No  Do you get at least three servings of calcium containing foods daily (dairy, green leafy vegetables, etc.)? yes   Outside of work or  "daily activities, how many days per week do you exercise for 30 minutes or longer? 3-4x per week  The patient does not drink >3 drinks per day nor >7 drinks per week.   Have you had an eye exam in the past two years? yes   Do you see a dentist twice per year? yes   Today's PHQ-2 Score:   Abuse: Current or Past(Physical, Sexual or Emotional)- No   Do you feel safe in your environment - Yes  Objective  Vitals: /66 (BP Location: Right arm, Cuff Size: Adult Regular)   Ht 1.515 m (4' 11.65\")   Wt 51.3 kg (113 lb)   LMP 02/17/2021   Breastfeeding No   BMI 22.33 kg/m    BMI= Body mass index is 22.33 kg/m .    General appearance=well developed, well-nourished female  Gait=normal  Psych=mood is stable, alert and oriented x3  HEENT=mucous membranes moist  Skin=no rashes or lesions seen,normal turgor   Breast:  Benign exam, no masses palpated.  No skin changes, no axillary lymphadenopathy, no nipple discharge.  Axilla feel completely normal, no lymph node enlargement and non-tender.  Neck=overall appearance is normal  Heart=RRR, no murmurs, no swelling noted  Thyroid=normal, no masses, no TTP, no enlargement  Lungs=non-labored breathing, no use of accessory muscles, clear to ausculation bilaterally  Abd=soft, Nontender/nondistended, +bowel sounds x4, no masses, no signs of hernias, no evidence of hepatosplenomegaly  PELVIC:    External genitalia: normal without lesions or masses  Urethral meatus: no lesions or prolapse noted, normal size  Urethra: no masses, non tender  Bladder: non tender, no fullness  Vagina: normal mucosa and rugae, no discharge.  Cervix: normal without lesion, no cervical motion tenderness, healthy, multiparous  Uterus: small, mobile, nontender.  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    25 minutes were spent on the date of the encounter doing chart review, history and exam, documentation, and further activities as noted above.    Total Visit Time: 30 minutes        Martha Garcia DO

## 2021-03-16 DIAGNOSIS — R17 ELEVATED BILIRUBIN: Primary | ICD-10-CM

## 2021-03-16 DIAGNOSIS — Z00.00 ANNUAL PHYSICAL EXAM: ICD-10-CM

## 2021-03-16 LAB
ALBUMIN SERPL-MCNC: 3.9 G/DL (ref 3.4–5)
ALP SERPL-CCNC: 68 U/L (ref 40–150)
ALT SERPL W P-5'-P-CCNC: 16 U/L (ref 0–50)
ANION GAP SERPL CALCULATED.3IONS-SCNC: 3 MMOL/L (ref 3–14)
AST SERPL W P-5'-P-CCNC: 12 U/L (ref 0–45)
BILIRUB SERPL-MCNC: 1.9 MG/DL (ref 0.2–1.3)
BUN SERPL-MCNC: 13 MG/DL (ref 7–30)
CALCIUM SERPL-MCNC: 8.9 MG/DL (ref 8.5–10.1)
CHLORIDE SERPL-SCNC: 106 MMOL/L (ref 94–109)
CHOLEST SERPL-MCNC: 152 MG/DL
CO2 SERPL-SCNC: 30 MMOL/L (ref 20–32)
CREAT SERPL-MCNC: 0.55 MG/DL (ref 0.52–1.04)
ERYTHROCYTE [DISTWIDTH] IN BLOOD BY AUTOMATED COUNT: 12.6 % (ref 10–15)
GFR SERPL CREATININE-BSD FRML MDRD: >90 ML/MIN/{1.73_M2}
GLUCOSE SERPL-MCNC: 93 MG/DL (ref 70–99)
HCT VFR BLD AUTO: 41.3 % (ref 35–47)
HDLC SERPL-MCNC: 71 MG/DL
HGB BLD-MCNC: 13.4 G/DL (ref 11.7–15.7)
LDLC SERPL CALC-MCNC: 70 MG/DL
MCH RBC QN AUTO: 28.7 PG (ref 26.5–33)
MCHC RBC AUTO-ENTMCNC: 32.4 G/DL (ref 31.5–36.5)
MCV RBC AUTO: 88 FL (ref 78–100)
NONHDLC SERPL-MCNC: 81 MG/DL
PLATELET # BLD AUTO: 261 10E9/L (ref 150–450)
POTASSIUM SERPL-SCNC: 3.7 MMOL/L (ref 3.4–5.3)
PROT SERPL-MCNC: 7.3 G/DL (ref 6.8–8.8)
RBC # BLD AUTO: 4.67 10E12/L (ref 3.8–5.2)
SODIUM SERPL-SCNC: 139 MMOL/L (ref 133–144)
TRIGL SERPL-MCNC: 54 MG/DL
WBC # BLD AUTO: 9.2 10E9/L (ref 4–11)

## 2021-03-16 PROCEDURE — 80061 LIPID PANEL: CPT | Performed by: OBSTETRICS & GYNECOLOGY

## 2021-03-16 PROCEDURE — 80053 COMPREHEN METABOLIC PANEL: CPT | Performed by: OBSTETRICS & GYNECOLOGY

## 2021-03-16 PROCEDURE — 36415 COLL VENOUS BLD VENIPUNCTURE: CPT | Performed by: OBSTETRICS & GYNECOLOGY

## 2021-03-16 PROCEDURE — 85027 COMPLETE CBC AUTOMATED: CPT | Performed by: OBSTETRICS & GYNECOLOGY

## 2021-04-11 ENCOUNTER — MYC MEDICAL ADVICE (OUTPATIENT)
Dept: OBGYN | Facility: OTHER | Age: 46
End: 2021-04-11

## 2021-04-22 DIAGNOSIS — R17 ELEVATED BILIRUBIN: ICD-10-CM

## 2021-04-22 LAB
BILIRUB DIRECT SERPL-MCNC: 0.2 MG/DL (ref 0–0.2)
BILIRUB SERPL-MCNC: 1.3 MG/DL (ref 0.2–1.3)

## 2021-04-22 PROCEDURE — 82247 BILIRUBIN TOTAL: CPT | Performed by: OBSTETRICS & GYNECOLOGY

## 2021-04-22 PROCEDURE — 82248 BILIRUBIN DIRECT: CPT | Performed by: OBSTETRICS & GYNECOLOGY

## 2021-04-22 PROCEDURE — 36415 COLL VENOUS BLD VENIPUNCTURE: CPT | Performed by: OBSTETRICS & GYNECOLOGY

## 2021-09-25 ENCOUNTER — HEALTH MAINTENANCE LETTER (OUTPATIENT)
Age: 46
End: 2021-09-25

## 2021-12-13 ENCOUNTER — NURSE TRIAGE (OUTPATIENT)
Dept: NURSING | Facility: CLINIC | Age: 46
End: 2021-12-13
Payer: COMMERCIAL

## 2021-12-13 NOTE — TELEPHONE ENCOUNTER
Triage Call: Thinks she has an infection. Started Wednesday. Genital area feels sore, warm and swollen. Clear discharge. Does not itch. Denied pain, states it is more of an irritation. Denied having a fever - unable to take temperature right now. Some redness to the vagina. Patient thought she had a UTI but stated that it does not feel like when she normally has a UTI. No blood in urine, no pain with urination.     According to the protocol, patient should be seen within 24 hours. Care advice given. Patient verbalizes understanding and agrees with plan of care. Connected with scheduling.    COVID 19 Nurse Triage Plan/Patient Instructions    Please be aware that novel coronavirus (COVID-19) may be circulating in the community. If you develop symptoms such as fever, cough, or SOB or if you have concerns about the presence of another infection including coronavirus (COVID-19), please contact your health care provider or visit [email or web address].     Disposition/Instructions    In-Person Visit with provider recommended. Reference Visit Selection Guide.    Thank you for taking steps to prevent the spread of this virus.  o Limit your contact with others.  o Wear a simple mask to cover your cough.  o Wash your hands well and often.    Resources    [facility]: About COVID-19: [email or web address]    CDC: What to Do If You're Sick: www.cdc.gov/coronavirus/2019-ncov/about/steps-when-sick.html    CDC: Ending Home Isolation: www.cdc.gov/coronavirus/2019-ncov/hcp/disposition-in-home-patients.html     CDC: Caring for Someone: www.cdc.gov/coronavirus/2019-ncov/if-you-are-sick/care-for-someone.html     [facility]: Interim Guidance for Hospital Discharge to Home: www.health.[facility] [place] [facility].[place]/diseases/coronavirus/hcp/hospdischarge.pdf    [facility] clinical trials (COVID-19 research studies): [email or web address]/umn-[email or web address]-trials     Below are the COVID-19 hotlines at the [facility]  Health ([facility]). Interpreters are available.   o For health questions: Call 927-528-0242 or 1-303.501.4847 (7 a.m. to 7 p.m.)  o For questions about schools and childcare: Call 840-780-9864 or 1-722.582.7040 (7 a.m. to 7 p.m.)     Janette Lott RN Nursing Advisor 12/13/2021 3:19 PM     Reason for Disposition    [1] MILD-MODERATE pain AND [2] present > 24 hours    Genital area looks infected (e.g., draining sore, spreading redness)    Additional Information    Negative: Sounds like a life-threatening emergency to the triager    Negative: Followed a genital area injury    Negative: Foreign body in vagina (e.g., tampon)    Negative: Vaginal bleeding is main symptom    Negative: Vaginal discharge is main symptom    Negative: Pain or burning with passing urine (urination) is main symptom    Negative: Menstrual cramps is main symptom    Negative: Abdomen pain is main symptom    Negative: Pubic lice suspected    Negative: Itching or rash of external female genital area (vulva)    Negative: Labor suspected    Negative: Patient sounds very sick or weak to the triager    Negative: [1] SEVERE pain AND [2] not improved 2 hours after pain medicine    Negative: [1] Genital area looks infected (e.g., draining sore, spreading redness) AND [2] fever    Negative: [1] Something is hanging out of the vagina AND [2] can't easily be pushed back inside    Negative: MODERATE-SEVERE itching (i.e., interferes with school, work, or sleep)    Protocols used: VAGINAL SYMPTOMS-A-AH

## 2022-02-07 ENCOUNTER — OFFICE VISIT (OUTPATIENT)
Dept: OBGYN | Facility: OTHER | Age: 47
End: 2022-02-07
Payer: COMMERCIAL

## 2022-02-07 VITALS
DIASTOLIC BLOOD PRESSURE: 64 MMHG | BODY MASS INDEX: 20.42 KG/M2 | HEIGHT: 60 IN | WEIGHT: 104 LBS | SYSTOLIC BLOOD PRESSURE: 102 MMHG

## 2022-02-07 DIAGNOSIS — Z00.00 ANNUAL PHYSICAL EXAM: Primary | ICD-10-CM

## 2022-02-07 DIAGNOSIS — Z88.9: ICD-10-CM

## 2022-02-07 DIAGNOSIS — F40.243 PHOBIA, FLYING: ICD-10-CM

## 2022-02-07 PROCEDURE — 99396 PREV VISIT EST AGE 40-64: CPT | Performed by: OBSTETRICS & GYNECOLOGY

## 2022-02-07 RX ORDER — CLONAZEPAM 0.5 MG/1
0.5 TABLET ORAL 2 TIMES DAILY PRN
Qty: 10 TABLET | Refills: 0 | Status: SHIPPED | OUTPATIENT
Start: 2022-02-07 | End: 2023-03-22

## 2022-02-07 ASSESSMENT — MIFFLIN-ST. JEOR: SCORE: 1022.68

## 2022-02-07 NOTE — PATIENT INSTRUCTIONS
PREVENTIVE HEALTH RECOMMENDATIONS:   Get a physical every year.  A pap test is important to have done starting at age 21 and then every three years as long as your pap is normal.  When you receive the results of your pap test it will include when you need to have your next pap test.    You should be tested each year for chlamydia and gonorrhea if you are aged 16-25 and if you have had a new sexual partner since you were last tested.   Vaccines: Get a flu shot each year.   Eat at least 8-10 servings of fruits and vegetables daily.  Eat whole-grain bread and cereal, whole-wheat pasta and brown rice instead of white grains and white rice.   For bone health: Eat calcium-rich foods (dairy products) or take calcium pills (500 to 600 mg with vitamin D) twice a day with food.   Exercise for an average of 30 minutes a day, 5 days of the week. It can be as simple as taking a brisk walk.  This will help you control your weight and prevent many diseases.   Limit alcohol to one drink per day.   Don't smoke and limit your exposure to second hand smoke.  If you smoke consider making a plan to quit. Go to [employer] and clink on   [employer]  for help   Wear sunscreen with at least SPF15 to prevent skin damage and skin cancer.   Brush your teeth twice a day and floss once a day and see your dentist twice a year for an exam and cleaning.   Have a great year and I will look forward to seeing you next year.   Martha Garcia DO    If you have labs or imaging done, the results will automatically release in [employer] without an interpretation.  Your health care professional will review those results and send an interpretation with recommendations as soon as possible, but this may be 1-3 business days.    If you have any questions regarding your visit, please contact your care team.     [facility] [facility]: [phone]  Women s Health CLINIC HOURS TELEPHONE NUMBER       DO Reshma Blackman   MARILOU Escobar-MANUELA Lindsey-MANUELA Michel-MANUELA Mendoza-  Janna-     Monday- [place]  8:00 a.m - 5:00 p.m    Tuesday- [place]  8:00 a.m - 5:00 p.m    Wednesday- OFF    Thursday- Surgery     Friday- [place]  8:00 a.m - 5:00 p.m. [facility]  [address]  [place] [address]  [phone] Fax  Imaging [phone]    [facility]  [address] [place] [address] [place] [address]  [phone]    [address] [place] [address]  [phone] Fax  Imaging [phone]     Urgent Care locations:    [facility] Monday-Friday  10 am - 8 pm  Saturday and Sunday   9 am - 5 pm  Monday-Friday   10 am - 8 pm  Saturday and Sunday   9 am - 5 pm   [phone]     If you need a medication refill, please contact your pharmacy. Please allow 3 business days for your refill to be completed.    As always, thank you for trusting us with your healthcare needs!

## 2022-02-07 NOTE — PROGRESS NOTES
Chief Complaint   Patient presents with     Physical       Subjective  Rachael Elder is a 46 year old female who presents for her annual exam.  Patient states she is doing well.  Her last menstrual period was 1/21.  Her menses are regular, monthly.  She has been bleeding a little longer lately.  7 days.  Patient uses pads and rarely has to change them.  3 days are heavy.  No problems urinating.  Normal bowel movements.  Patient is sexually active.  No dyspareunia.  No vaginal spotting after.  3 NSVDs.  Her  has had a vasectomy.  Patient declines the flu vaccine.  She has multiple drug allergies and it was recently recommended that she follow up with allergy to determine true allergies.      Most recent pap: 2020  History of abnormal Pap smear:  No  History of STI's:  No  History of PID:   No    Family history of uterine cancer:  No  Family history of ovarian cancer:  No  Family history of colon cancer:   No  Family history of breast cancer:  No    ROS  ROS: 10 point ROS neg other than the symptoms noted above in the HPI.    Past Medical History:   Diagnosis Date     NO ACTIVE PROBLEMS      Past Surgical History:   Procedure Laterality Date     LITHOTRIPSY  4/2009    LASER--Renal stone treaatment -- ?right     Family History   Problem Relation Age of Onset     Diabetes Mother      Lipids Mother      Cancer Mother         Lung     Coronary Artery Disease No family hx of      Hyperlipidemia No family hx of      Breast Cancer No family hx of      Other Cancer No family hx of      Substance Abuse No family hx of      Osteoporosis No family hx of      Depression No family hx of      Mental Illness No family hx of      Colon Cancer No family hx of      Prostate Cancer No family hx of      Cerebrovascular Disease No family hx of      Hypertension No family hx of      Social History     Tobacco Use     Smoking status: Never Smoker     Smokeless tobacco: Never Used   Substance Use Topics     Alcohol use: No  "      Tobacco abuse: No  Do you get at least three servings of calcium containing foods daily (dairy, green leafy vegetables, etc.)? yes   Outside of work or daily activities, how many days per week do you exercise for 30 minutes or longer? 3-4x per week  The patient does not drink >3 drinks per day nor >7 drinks per week.   Have you had an eye exam in the past two years? yes   Do you see a dentist twice per year? yes   Today's PHQ-2 Score:   Abuse: Current or Past(Physical, Sexual or Emotional)- No   Do you feel safe in your environment - Yes  Objective  Vitals: /64 (BP Location: Right arm, Cuff Size: Adult Regular)   Ht 1.515 m (4' 11.65\")   Wt 47.2 kg (104 lb)   LMP 01/21/2022   Breastfeeding No   BMI 20.55 kg/m    BMI= Body mass index is 20.55 kg/m .    General appearance=well developed, well-nourished female  Gait=normal  Psych=mood is stable, alert and oriented x3  HEENT=mucous membranes moist  Skin=no rashes or lesions seen,normal turgor   Breast:  Benign exam, no masses palpated.  No skin changes, no axillary lymphadenopathy, no nipple discharge.  Axilla feel completely normal, no lymph node enlargement and non-tender.  Neck=overall appearance is normal  Heart=RRR, no murmurs, no swelling noted  Thyroid=normal, no masses, no TTP, no enlargement  Lungs=non-labored breathing, no use of accessory muscles, clear to ausculation bilaterally  Abd=soft, Nontender/nondistended, +bowel sounds x4, no masses, no signs of hernias, no evidence of hepatosplenomegaly  PELVIC:    External genitalia: normal without lesions or masses  Urethral meatus: no lesions or prolapse noted, normal size  Urethra: no masses, non tender  Bladder: non tender, no fullness  Vagina: normal mucosa and rugae, no discharge.  Cervix: normal without lesion, no cervical motion tenderness, healthy, multiparous  Uterus: small, mobile, nontender.  Adnexa: non tender, without masses  Rectal: deferred  Ext=no clubbing or cyanosis, no " swelling      Last lipid profile: 2021  Regular self breast exam: No  Most recent mammogram:  2021-next is already scheduled  History of abnormal mammogram:  No  Fit testing:  N/A  DEXA:  N/A        Assessment/Plan  1.)  Annual/well woman exam=mammogram  2.)  Fear of flying=Klonopin reordered  3.)  Multiple drug allergies=allergy referral placed      The following topics were discussed or recommended   Discussed seat belt, helmet and sunscreen use  Vision screening   Mammogram   Calcium/Vitamin D supplement=Recommended 1000 mg of calcium daily and 800 IU of vitamin D.    30 minutes were spent on the date of the encounter doing chart review, history and exam, documentation, and further activities as noted above.        Martha Garcia, DO

## 2022-02-28 ENCOUNTER — ANCILLARY PROCEDURE (OUTPATIENT)
Dept: MAMMOGRAPHY | Facility: CLINIC | Age: 47
End: 2022-02-28
Attending: OBSTETRICS & GYNECOLOGY
Payer: COMMERCIAL

## 2022-02-28 DIAGNOSIS — Z12.31 ENCOUNTER FOR SCREENING MAMMOGRAM FOR BREAST CANCER: ICD-10-CM

## 2022-02-28 PROCEDURE — 77063 BREAST TOMOSYNTHESIS BI: CPT | Mod: GC | Performed by: STUDENT IN AN ORGANIZED HEALTH CARE EDUCATION/TRAINING PROGRAM

## 2022-02-28 PROCEDURE — 77067 SCR MAMMO BI INCL CAD: CPT | Mod: GC | Performed by: STUDENT IN AN ORGANIZED HEALTH CARE EDUCATION/TRAINING PROGRAM

## 2022-04-05 ENCOUNTER — OFFICE VISIT (OUTPATIENT)
Dept: ALLERGY | Facility: OTHER | Age: 47
End: 2022-04-05
Payer: COMMERCIAL

## 2022-04-05 VITALS
BODY MASS INDEX: 20.42 KG/M2 | SYSTOLIC BLOOD PRESSURE: 110 MMHG | OXYGEN SATURATION: 100 % | HEIGHT: 60 IN | DIASTOLIC BLOOD PRESSURE: 68 MMHG | WEIGHT: 104 LBS | HEART RATE: 74 BPM

## 2022-04-05 DIAGNOSIS — T50.905A ADVERSE EFFECT OF DRUG, INITIAL ENCOUNTER: Primary | ICD-10-CM

## 2022-04-05 PROCEDURE — 99243 OFF/OP CNSLTJ NEW/EST LOW 30: CPT | Performed by: ALLERGY & IMMUNOLOGY

## 2022-04-05 ASSESSMENT — ENCOUNTER SYMPTOMS
CHILLS: 0
CHEST TIGHTNESS: 0
VOMITING: 0
EYE ITCHING: 0
EYE DISCHARGE: 0
FEVER: 0
WHEEZING: 0
MYALGIAS: 0
ADENOPATHY: 0
HEADACHES: 0
NAUSEA: 0
ARTHRALGIAS: 0
DIARRHEA: 0
ACTIVITY CHANGE: 0
FACIAL SWELLING: 0
ABDOMINAL PAIN: 0
SINUS PRESSURE: 0
RHINORRHEA: 0
SHORTNESS OF BREATH: 0
EYE REDNESS: 0
COUGH: 0

## 2022-04-05 NOTE — LETTER
4/5/2022         RE: Rachael Elder  [address] [facility] [address]        Dear Colleague,    Thank you for referring your patient, Rachael Elder, to the [facility]. Please see a copy of my visit note below.    SUBJECTIVE:                                                                   Rachael Elder presents today to our Allergy Clinic at [facility]; She is being seen in consultation at the request of Dr. Martha Garcia, for antibiotic allergy evaluation.   In 2002, she was given amoxicillin for a sinus infection. She believes that she developed generalized hives after the second or third dose. She does not remember how she was treated for that reaction. The rash cleared after she stopped the medicine, but unclear how long it took to get better.  That reaction did not require an ED visit or hospitalization. She has been avoiding penicillins since then.  She reports having a similar reaction to Macrobid about 20 years ago.        Patient Active Problem List   Diagnosis     Phobia, flying       Past Medical History:   Diagnosis Date     NO ACTIVE PROBLEMS       Problem (# of Occurrences) Relation (Name,Age of Onset)    Asthma (1) Son    Cancer (1) Mother: Lung    Diabetes (1) Mother    Lipids (1) Mother       Negative family history of: Coronary Artery Disease, Hyperlipidemia, Breast Cancer, Other Cancer, Substance Abuse, Osteoporosis, Depression, Mental Illness, Colon Cancer, Prostate Cancer, Cerebrovascular Disease, Hypertension        Past Surgical History:   Procedure Laterality Date     LITHOTRIPSY  4/2009    LASER--Renal stone treaatment -- ?right     Social History     Socioeconomic History     Marital status:      Spouse name: None     Number of children: 3     Years of education: None     Highest education level: None   Occupational History     Comment: Registered Dietitian   Tobacco Use     Smoking status: Never Smoker      Smokeless tobacco: Never Used   Vaping Use     Vaping Use: Never used   Substance and Sexual Activity     Alcohol use: No     Drug use: No     Sexual activity: Yes     Partners: Male     Birth control/protection: Surgical     Comment: vasectomy   Other Topics Concern     Parent/sibling w/ CABG, MI or angioplasty before 65F 55M? No   Social History Narrative    ENVIRONMENTAL HISTORY: [place] The home is heated with a forced air and gas furnace. They do have central air conditioning. The patient's bedroom is furnished with carpeting in bedroom.  Pets inside the house include 0 pets. There is no history of cockroach or mice infestation. There is/are 0 smokers in the house.  The house does not have a damp basement.      Social Determinants of Health     Financial Resource Strain: Not on file   Food Insecurity: Not on file   Transportation Needs: Not on file   Physical Activity: Not on file   Stress: Not on file   Social Connections: Not on file   Intimate Partner Violence: Not on file   Housing Stability: Not on file           Review of Systems   Constitutional: Negative for activity change, chills and fever.   HENT: Negative for congestion, ear discharge, facial swelling, nosebleeds, postnasal drip, rhinorrhea, sinus pressure and sneezing.    Eyes: Negative for discharge, redness and itching.   Respiratory: Negative for cough, chest tightness, shortness of breath and wheezing.    Cardiovascular: Negative for chest pain.   Gastrointestinal: Negative for abdominal pain, diarrhea, nausea and vomiting.   Musculoskeletal: Negative for arthralgias and myalgias.   Skin: Negative for rash.   Allergic/Immunologic: Negative for environmental allergies.   Neurological: Negative for headaches.   Hematological: Negative for adenopathy.   Psychiatric/Behavioral: Negative for behavioral problems and self-injury.           Current Outpatient Medications:      MULTI-DAY VITAMINS OR, 1  "TABLET DAILY, Disp: , Rfl:      Probiotic Product (PROBIOTIC PO), Take  by mouth daily., Disp: , Rfl:      acetaminophen (TYLENOL) 325 MG tablet, Take 1-2 tablets by mouth every 6 hours as needed. (Patient not taking: Reported on 4/5/2022), Disp: , Rfl:      clonazePAM (KLONOPIN) 0.5 MG tablet, Take 1 tablet (0.5 mg) by mouth 2 times daily as needed for anxiety (Patient not taking: Reported on 4/5/2022), Disp: 10 tablet, Rfl: 0  Immunization History   Administered Date(s) Administered     Influenza (IIV3) PF 11/22/2005, 11/16/2007, 10/14/2008, 10/29/2009, 11/20/2009, 10/11/2012, 10/01/2018     Influenza Vaccine IM > 6 months Valent IIV4 (Alfuria,Fluzone) 10/13/2014, 10/10/2017, 10/01/2018, 09/16/2019, 10/15/2020     MMR 07/28/1995     Mantoux Tuberculin Skin Test 07/28/1995     TD (ADULT, 7+) 08/29/1997, 05/01/2007     TDAP Vaccine (Adacel) 06/28/2018     Tdap (Adacel,Boostrix) 03/13/2007     Allergies   Allergen Reactions     Codeine Nausea and Vomiting     Macrobid [Nitrofurantoin Monohydrate Macrocrystals]      Rash, Hives     Penicillin [Penicillins]      Rash     OBJECTIVE:                                                                 /68   Pulse 74   Ht 1.515 m (4' 11.65\")   Wt 47.2 kg (104 lb)   SpO2 100%   BMI 20.55 kg/m          Physical Exam  Vitals and nursing note reviewed.   Constitutional:       General: She is not in acute distress.     Appearance: She is not ill-appearing, toxic-appearing or diaphoretic.   HENT:      Head: Normocephalic and atraumatic.      Right Ear: Tympanic membrane, ear canal and external ear normal.      Left Ear: Tympanic membrane, ear canal and external ear normal.      Nose: No congestion or rhinorrhea.      Right Turbinates: Not enlarged, swollen or pale.      Left Turbinates: Not enlarged, swollen or pale.      Mouth/Throat:      Lips: Pink.      Mouth: Mucous membranes are moist.      Pharynx: Oropharynx is clear. No pharyngeal swelling, oropharyngeal exudate, " posterior oropharyngeal erythema or uvula swelling.   Eyes:      General:         Right eye: No discharge.         Left eye: No discharge.      Conjunctiva/sclera: Conjunctivae normal.   Cardiovascular:      Rate and Rhythm: Normal rate and regular rhythm.      Heart sounds: Normal heart sounds.   Pulmonary:      Effort: Pulmonary effort is normal. No respiratory distress.      Breath sounds: Normal breath sounds and air entry. No stridor, decreased air movement or transmitted upper airway sounds. No decreased breath sounds, wheezing, rhonchi or rales.   Neurological:      Mental Status: She is alert and oriented to person, place, and time.   Psychiatric:         Mood and Affect: Mood normal.         Behavior: Behavior normal.           ASSESSMENT/PLAN:    Adverse effect of drug, initial encounter    The time elapsed since the last reaction is important because penicillin-specific IgE antibodies decrease over time. Therefore, patients with recent reactions are more likely to be allergic than patients with distant reactions. Approximately 80 percent of patients with IgE-mediated penicillin allergy lose the sensitivity after 10 years.  -Recommend penicillin testing with subsequent amoxicillin oral challenge in office settings if the test is negative. She will think about it.     In regards to nitrofurantoin, things are more complicated. Immediate hypersensitivity reactions to nitrofurantoin are rare. To date, no standardized immediate or delayed hypersensitivity skin testing protocols were nitrofurantoin have been published despite a few case reports. Case reports of drug desensitization have not been widely described. Diagnosis is therefore limited to a direct oral challenge. If the patient indeed had an allergic reaction to nitrofurantoin, it should be empirically avoided.      Return if symptoms worsen or fail to improve.    Thank you for allowing us to participate in the care of this patient. Please feel free to  contact us if there are any questions or concerns about the patient.    Disclaimer: This note consists of symbols derived from keyboarding, dictation and/or voice recognition software. As a result, there may be errors in the script that have gone undetected. Please consider this when interpreting information found in this chart.    Noman Hsu MD, FAAAAI, FACAAI  Allergy, Asthma and Immunology     [facility]       Again, thank you for allowing me to participate in the care of your patient.        Sincerely,        Noman Hsu MD

## 2022-04-05 NOTE — PROGRESS NOTES
SUBJECTIVE:                                                                   Rachael Elder presents today to our Allergy Clinic at [facility]; She is being seen in consultation at the request of Dr. Martha Garcia, for antibiotic allergy evaluation.   In 2002, she was given amoxicillin for a sinus infection. She believes that she developed generalized hives after the second or third dose. She does not remember how she was treated for that reaction. The rash cleared after she stopped the medicine, but unclear how long it took to get better.  That reaction did not require an ED visit or hospitalization. She has been avoiding penicillins since then.  She reports having a similar reaction to Macrobid about 20 years ago.        Patient Active Problem List   Diagnosis     Phobia, flying       Past Medical History:   Diagnosis Date     NO ACTIVE PROBLEMS       Problem (# of Occurrences) Relation (Name,Age of Onset)    Asthma (1) Son    Cancer (1) Mother: Lung    Diabetes (1) Mother    Lipids (1) Mother       Negative family history of: Coronary Artery Disease, Hyperlipidemia, Breast Cancer, Other Cancer, Substance Abuse, Osteoporosis, Depression, Mental Illness, Colon Cancer, Prostate Cancer, Cerebrovascular Disease, Hypertension        Past Surgical History:   Procedure Laterality Date     LITHOTRIPSY  4/2009    LASER--Renal stone treaatment -- ?right     Social History     Socioeconomic History     Marital status:      Spouse name: None     Number of children: 3     Years of education: None     Highest education level: None   Occupational History     Comment: Registered Dietitian   Tobacco Use     Smoking status: Never Smoker     Smokeless tobacco: Never Used   Vaping Use     Vaping Use: Never used   Substance and Sexual Activity     Alcohol use: No     Drug use: No     Sexual activity: Yes     Partners: Male     Birth control/protection: Surgical     Comment: vasectomy   Other  Topics Concern     Parent/sibling w/ CABG, MI or angioplasty before 65F 55M? No   Social History Narrative    ENVIRONMENTAL HISTORY: [place] The home is heated with a forced air and gas furnace. They do have central air conditioning. The patient's bedroom is furnished with carpeting in bedroom.  Pets inside the house include 0 pets. There is no history of cockroach or mice infestation. There is/are 0 smokers in the house.  The house does not have a damp basement.      Social Determinants of Health     Financial Resource Strain: Not on file   Food Insecurity: Not on file   Transportation Needs: Not on file   Physical Activity: Not on file   Stress: Not on file   Social Connections: Not on file   Intimate Partner Violence: Not on file   Housing Stability: Not on file           Review of Systems   Constitutional: Negative for activity change, chills and fever.   HENT: Negative for congestion, ear discharge, facial swelling, nosebleeds, postnasal drip, rhinorrhea, sinus pressure and sneezing.    Eyes: Negative for discharge, redness and itching.   Respiratory: Negative for cough, chest tightness, shortness of breath and wheezing.    Cardiovascular: Negative for chest pain.   Gastrointestinal: Negative for abdominal pain, diarrhea, nausea and vomiting.   Musculoskeletal: Negative for arthralgias and myalgias.   Skin: Negative for rash.   Allergic/Immunologic: Negative for environmental allergies.   Neurological: Negative for headaches.   Hematological: Negative for adenopathy.   Psychiatric/Behavioral: Negative for behavioral problems and self-injury.           Current Outpatient Medications:      MULTI-DAY VITAMINS OR, 1 TABLET DAILY, Disp: , Rfl:      Probiotic Product (PROBIOTIC PO), Take  by mouth daily., Disp: , Rfl:      acetaminophen (TYLENOL) 325 MG tablet, Take 1-2 tablets by mouth every 6 hours as needed. (Patient not taking: Reported on 4/5/2022), Disp: , Rfl:       "clonazePAM (KLONOPIN) 0.5 MG tablet, Take 1 tablet (0.5 mg) by mouth 2 times daily as needed for anxiety (Patient not taking: Reported on 4/5/2022), Disp: 10 tablet, Rfl: 0  Immunization History   Administered Date(s) Administered     Influenza (IIV3) PF 11/22/2005, 11/16/2007, 10/14/2008, 10/29/2009, 11/20/2009, 10/11/2012, 10/01/2018     Influenza Vaccine IM > 6 months Valent IIV4 (Alfuria,Fluzone) 10/13/2014, 10/10/2017, 10/01/2018, 09/16/2019, 10/15/2020     MMR 07/28/1995     Mantoux Tuberculin Skin Test 07/28/1995     TD (ADULT, 7+) 08/29/1997, 05/01/2007     TDAP Vaccine (Adacel) 06/28/2018     Tdap (Adacel,Boostrix) 03/13/2007     Allergies   Allergen Reactions     Codeine Nausea and Vomiting     Macrobid [Nitrofurantoin Monohydrate Macrocrystals]      Rash, Hives     Penicillin [Penicillins]      Rash     OBJECTIVE:                                                                 /68   Pulse 74   Ht 1.515 m (4' 11.65\")   Wt 47.2 kg (104 lb)   SpO2 100%   BMI 20.55 kg/m          Physical Exam  Vitals and nursing note reviewed.   Constitutional:       General: She is not in acute distress.     Appearance: She is not ill-appearing, toxic-appearing or diaphoretic.   HENT:      Head: Normocephalic and atraumatic.      Right Ear: Tympanic membrane, ear canal and external ear normal.      Left Ear: Tympanic membrane, ear canal and external ear normal.      Nose: No congestion or rhinorrhea.      Right Turbinates: Not enlarged, swollen or pale.      Left Turbinates: Not enlarged, swollen or pale.      Mouth/Throat:      Lips: Pink.      Mouth: Mucous membranes are moist.      Pharynx: Oropharynx is clear. No pharyngeal swelling, oropharyngeal exudate, posterior oropharyngeal erythema or uvula swelling.   Eyes:      General:         Right eye: No discharge.         Left eye: No discharge.      Conjunctiva/sclera: Conjunctivae normal.   Cardiovascular:      Rate and Rhythm: Normal rate and regular rhythm. "      Heart sounds: Normal heart sounds.   Pulmonary:      Effort: Pulmonary effort is normal. No respiratory distress.      Breath sounds: Normal breath sounds and air entry. No stridor, decreased air movement or transmitted upper airway sounds. No decreased breath sounds, wheezing, rhonchi or rales.   Neurological:      Mental Status: She is alert and oriented to person, place, and time.   Psychiatric:         Mood and Affect: Mood normal.         Behavior: Behavior normal.           ASSESSMENT/PLAN:    Adverse effect of drug, initial encounter    The time elapsed since the last reaction is important because penicillin-specific IgE antibodies decrease over time. Therefore, patients with recent reactions are more likely to be allergic than patients with distant reactions. Approximately 80 percent of patients with IgE-mediated penicillin allergy lose the sensitivity after 10 years.  -Recommend penicillin testing with subsequent amoxicillin oral challenge in office settings if the test is negative. She will think about it.     In regards to nitrofurantoin, things are more complicated. Immediate hypersensitivity reactions to nitrofurantoin are rare. To date, no standardized immediate or delayed hypersensitivity skin testing protocols were nitrofurantoin have been published despite a few case reports. Case reports of drug desensitization have not been widely described. Diagnosis is therefore limited to a direct oral challenge. If the patient indeed had an allergic reaction to nitrofurantoin, it should be empirically avoided.      Return if symptoms worsen or fail to improve.    Thank you for allowing us to participate in the care of this patient. Please feel free to contact us if there are any questions or concerns about the patient.    Disclaimer: This note consists of symbols derived from keyboarding, dictation and/or voice recognition software. As a result, there may be errors in the script that have gone undetected.  Please consider this when interpreting information found in this chart.    Noman Hsu MD, FAAAAI, FACAAI  Allergy, Asthma and Immunology     [facility]

## 2022-04-05 NOTE — PATIENT INSTRUCTIONS
There is no skin test available for Macrobid, so I 'd recommend to continue avoidance if you are not up for the challenge.     The time elapsed since the last reaction is important because penicillin-specific IgE antibodies decrease over time, and therefore patients with recent reactions are more likely to be allergic than patients with distant reactions. Approximately 80 percent of patients with IgE-mediated penicillin allergy lose the sensitivity after 10 years.  -Recommend penicillin testing with subsequent amoxicillin oral challenge in office settings if the test is negative.    If you decide to get it done, you should do it through Allergy RN only.       Allergy Staff Appt Hours Shot Hours Locations    Physician     Noman Hsu MD       Support Staff     MANUELA Jarvis CMA  Tuesday:   [place] :  [place]: :         [place]:  [place] [place]        Tuesday: : : : : :[facility]       Tues & Wed: : & Thurs: :: :         [facility]  [address] [place] [address]  Appt Line: [phone]    [facility]  [address] [place] [address]  Appt Line: [phone]    Pulmonary Function Scheduling:  [place]: [phone]  [place]: [phone]  [phone]     Prescription Assistance    If you need assistance with your prescriptions (cost, coverage, etc) please contact: [place] [facility] [phone]    Important Scheduling Information    Appointments for skin testing: Appointment will last approximately 45 minutes.  Please call the appointment line for your clinic to schedule.  Discontinue oral antihistamines 7 days prior to the appointment.  Discontinue nasal and ocular antihistamines 4 days prior to appointment.    Appointments for  challenges (oral food challenge, penicillin testing, aspirin desensitization) If your provider instructs you to that this additional testing is indicated, it will need to be scheduled directly through the allergy department.  Please contact them via [employer] or by calling the clinic and asking to speak with the allergy team.  They will provide additional information and instructions for the appointment.  Discontinue oral antihistamines 7 days prior to the appointment.  Discontinue nasal and ocular antihistamines 4 days prior to the appointment.    Thank you for trusting us with your care. Please feel free to contact us with any questions or concerns you may have.

## 2022-05-25 ENCOUNTER — OFFICE VISIT (OUTPATIENT)
Dept: ALLERGY | Facility: OTHER | Age: 47
End: 2022-05-25
Payer: COMMERCIAL

## 2022-05-25 VITALS
HEART RATE: 77 BPM | HEIGHT: 60 IN | WEIGHT: 107 LBS | OXYGEN SATURATION: 100 % | SYSTOLIC BLOOD PRESSURE: 104 MMHG | BODY MASS INDEX: 21.01 KG/M2 | DIASTOLIC BLOOD PRESSURE: 66 MMHG

## 2022-05-25 DIAGNOSIS — Z88.0 H/O ALLERGY TO PENICILLIN: Primary | ICD-10-CM

## 2022-05-25 PROCEDURE — 95018 ALL TSTG PERQ&IQ DRUGS/BIOL: CPT | Performed by: ALLERGY & IMMUNOLOGY

## 2022-05-25 PROCEDURE — 99207 PR DROP WITH A PROCEDURE: CPT | Performed by: ALLERGY & IMMUNOLOGY

## 2022-05-25 PROCEDURE — 95076 INGEST CHALLENGE INI 120 MIN: CPT | Performed by: ALLERGY & IMMUNOLOGY

## 2022-05-25 ASSESSMENT — ENCOUNTER SYMPTOMS
DIARRHEA: 0
VOMITING: 0
SINUS PRESSURE: 0
WHEEZING: 0
CHEST TIGHTNESS: 0
ARTHRALGIAS: 0
FEVER: 0
FACIAL SWELLING: 0
EYE REDNESS: 0
HEADACHES: 0
RHINORRHEA: 0
MYALGIAS: 0
CHILLS: 0
EYE DISCHARGE: 0
COUGH: 0
ACTIVITY CHANGE: 0
ADENOPATHY: 0
SHORTNESS OF BREATH: 0
NAUSEA: 0
EYE ITCHING: 0

## 2022-05-25 NOTE — PROGRESS NOTES
SUBJECTIVE:                                                                 Rachael Elder is a 47 year old female presenting today to our Allergy Clinic at  [facility] for penicillin testing.   Today, she is in good health.  No recent urticaria, angioedema, vomiting, nausea, diarrhea, wheezing, or persistent rhinoconjunctivitis symptoms.             Patient Active Problem List   Diagnosis     Phobia, flying       Past Medical History:   Diagnosis Date     NO ACTIVE PROBLEMS       Problem (# of Occurrences) Relation (Name,Age of Onset)    Asthma (1) Son    Cancer (1) Mother: Lung    Diabetes (1) Mother    Lipids (1) Mother       Negative family history of: Coronary Artery Disease, Hyperlipidemia, Breast Cancer, Other Cancer, Substance Abuse, Osteoporosis, Depression, Mental Illness, Colon Cancer, Prostate Cancer, Cerebrovascular Disease, Hypertension        Past Surgical History:   Procedure Laterality Date     LITHOTRIPSY  4/2009    LASER--Renal stone treaatment -- ?right     Social History     Socioeconomic History     Marital status:      Spouse name: None     Number of children: 3     Years of education: None     Highest education level: None   Occupational History     Comment: Registered Dietitian   Tobacco Use     Smoking status: Never Smoker     Smokeless tobacco: Never Used   Vaping Use     Vaping Use: Never used   Substance and Sexual Activity     Alcohol use: No     Drug use: No     Sexual activity: Yes     Partners: Male     Birth control/protection: Surgical     Comment: vasectomy   Other Topics Concern     Parent/sibling w/ CABG, MI or angioplasty before 65F 55M? No   Social History Narrative    ENVIRONMENTAL HISTORY: [place] The home is heated with a forced air and gas furnace. They do have central air conditioning. The patient's bedroom is furnished with carpeting in bedroom.  Pets inside the house include 0 pets. There is  no history of cockroach or mice infestation. There is/are 0 smokers in the house.  The house does not have a damp basement.            Review of Systems   Constitutional: Negative for activity change, chills and fever.   HENT: Negative for congestion, ear discharge, facial swelling, nosebleeds, postnasal drip, rhinorrhea, sinus pressure and sneezing.    Eyes: Negative for discharge, redness and itching.   Respiratory: Negative for cough, chest tightness, shortness of breath and wheezing.    Cardiovascular: Negative for chest pain.   Gastrointestinal: Negative for diarrhea, nausea and vomiting.   Musculoskeletal: Negative for arthralgias and myalgias.   Skin: Negative for rash.   Allergic/Immunologic: Negative for environmental allergies.   Neurological: Negative for headaches.   Hematological: Negative for adenopathy.   Psychiatric/Behavioral: Negative for behavioral problems and self-injury.           Current Outpatient Medications:      acetaminophen (TYLENOL) 325 MG tablet, Take 1-2 tablets by mouth every 6 hours as needed, Disp: , Rfl:      clonazePAM (KLONOPIN) 0.5 MG tablet, Take 1 tablet (0.5 mg) by mouth 2 times daily as needed for anxiety, Disp: 10 tablet, Rfl: 0     MULTI-DAY VITAMINS OR, 1 TABLET DAILY, Disp: , Rfl:      Probiotic Product (PROBIOTIC PO), Take  by mouth daily., Disp: , Rfl:   Immunization History   Administered Date(s) Administered     Influenza (IIV3) PF 11/22/2005, 11/16/2007, 10/14/2008, 10/29/2009, 11/20/2009, 10/11/2012, 10/01/2018     Influenza Vaccine IM > 6 months Valent IIV4 (Alfuria,Fluzone) 10/13/2014, 10/10/2017, 10/01/2018, 09/16/2019, 10/15/2020     MMR 07/28/1995     Mantoux Tuberculin Skin Test 07/28/1995     TD (ADULT, 7+) 08/29/1997, 05/01/2007     TDAP Vaccine (Adacel) 06/28/2018     Tdap (Adacel,Boostrix) 03/13/2007     Allergies   Allergen Reactions     Codeine Nausea and Vomiting     Macrobid [Nitrofurantoin Monohydrate Macrocrystals]      Rash, Hives     Penicillin  "[Penicillins]      Rash     OBJECTIVE:                                                                 /66   Pulse 77   Ht 1.515 m (4' 11.65\")   Wt 48.5 kg (107 lb)   SpO2 100%   BMI 21.14 kg/m          Physical Exam  Vitals and nursing note reviewed.   Constitutional:       General: She is not in acute distress.     Appearance: She is not ill-appearing, toxic-appearing or diaphoretic.   HENT:      Head: Normocephalic and atraumatic.      Right Ear: Tympanic membrane, ear canal and external ear normal.      Left Ear: Tympanic membrane, ear canal and external ear normal.      Nose: No congestion or rhinorrhea.      Right Turbinates: Not enlarged, swollen or pale.      Left Turbinates: Not enlarged, swollen or pale.      Mouth/Throat:      Lips: Pink.      Mouth: Mucous membranes are moist.      Pharynx: Oropharynx is clear. No pharyngeal swelling, oropharyngeal exudate, posterior oropharyngeal erythema or uvula swelling.   Eyes:      General:         Right eye: No discharge.         Left eye: No discharge.      Conjunctiva/sclera: Conjunctivae normal.   Cardiovascular:      Rate and Rhythm: Normal rate and regular rhythm.      Heart sounds: Normal heart sounds.   Pulmonary:      Effort: Pulmonary effort is normal. No respiratory distress.      Breath sounds: Normal breath sounds and air entry. No stridor, decreased air movement or transmitted upper airway sounds. No decreased breath sounds, wheezing, rhonchi or rales.   Neurological:      Mental Status: She is alert and oriented to person, place, and time.   Psychiatric:         Mood and Affect: Mood normal.         Behavior: Behavior normal.               WORKUP:     Instructions:  1. In quick sequence, apply prick skin tests with penicillin reagents, plus positive and negative controls.  2. If prick test is negative or equivocal, apply Penicillin intradermal tests (2-3mm blebs) in duplicate along with diluent or saline control  3. Read intradermal tests " at time of placement and after 15 minutes  4. Read skin prick testing 15 minutes after placement  Product Lot #/Exp. Date Time Placed Skin Prick  W (mm) F (mm) Time Placed Intradermal #1  W (mm) F (mm) Intradermal #2  W (mm) F (mm)  Results  +/-       Time Read      Pre-Pen  0.02 ml of 6X10-5 molar [id number]  9/30/22 749 - - 810 3 - 4 - -        825 3 - 3 -    Penicillin G  10,000 units/ ml [id number]  2/28/23 749 - - 811 4 - 5 - -        826 3 - - -    Diluent/Control [id number]  9/1/22 749 - - 808 5 -  -               Histamine Food trays 749 5 7  +     Criteria:  1. Positive prick skin test:  Induration >3mm greater than control.  2. Positive ID skin test:  Significant increase in size of original bleb with wheal diameter 3mm or larger than control.  3. Negative ID skin test:  No increase in size of original bleb and no reaction greater than control site.  4. Equivocal ID skin test: Wheal only slightly larger than initial injection bleb and control site, with or without erythematous flare OR duplicates are discordant.  5. Control site: If wheal >2-3mm after 15 min, repeat skin test to look for dermatographism.  Oral Challenge  Amoxicillin  250mg chewable tablet  or  250mg/5mL oral suspension Lot #/Exp. Dose Time of Ingestion Time of Vitals BP Pulse  (bpm) Signs/Symptoms Result  +/-     Dose (125mg)  Wait 30 minutes [id number]  4/30/23 1.   Tablet  2. 2.5mL 834 906 100/68 70 - -   Full dose (375mg)  Wait 60 minutes [id number]  4/30/23 1.  1   Tablets  2.  7.5mL 929 1035 100/62 65 - -         After explaining advantages and disadvantages, including the risks of oral challenge, and signing the consent, we proceeded with testing.      Percutaneous skin puncture testing for Pre-Pen and pen G was negative with appropriate responses to positive and negative controls. Intradermal testing for Pre-Pen and pen G, in duplicates, was also negative.     The patient tolerated graded Amoxicillin oral challenge. She was monitored for 1 hour  after last dose.        Challenge START TIME: 8:34 am     END TIME: 10:35 am    Total duration of the procedure: 2 hours and 1 minute     ASSESSMENT/PLAN:      H/O allergy to penicillin    No evidence of an IgE sensitization to penicillins.  If everything is fine, the next day, asked to call us with an update. At that time will remove penicillin from the allergy list.    - [place] ALLG TEST PERQ & IC DRUG/BIOL IMMED REACT W/ I&R  - [place] INGESTION CHALLENGE TEST INITIAL 120 MINUTES        Thank you for allowing us to participate in the care of this patient. Please feel free to contact us if there are any questions or concerns about the patient.    Disclaimer: This note consists of symbols derived from keyboarding, dictation and/or voice recognition software. As a result, there may be errors in the script that have gone undetected. Please consider this when interpreting information found in this chart.    Noman Hsu MD, FAAAAI, FACAAI  Allergy and Asthma     [facility]

## 2022-05-25 NOTE — NURSING NOTE
Patient evaluated by provider prior to start of penicillin challenge.  RN administered skin prick testing and intradermals per physician directed guidelines.  Patient was monitored for 15 minutes after each administered dose.  Both oral doses of Penicillin were given without reaction. Vital signs were recorded. Patient tolerated the testing well. All questions and concerns were addressed in clinic during challenge.     Ksenia Whitley RN

## 2022-05-25 NOTE — PATIENT INSTRUCTIONS
Allergy Staff Appt Hours Shot Hours Locations    Physician     Noman Hsu MD       Support Staff     MANUELA Jarvis CMA  Tuesday:   [place]: :         [place]:  [place]        Tuesday: : : : :  & Wed: :       Mon & Thurs:        Fri:          [facility]  [address] [place] [address]  Appt Line: [phone]    [facility]  [address] [place] [address]  Appt Line: [phone]    Pulmonary Function Scheduling:  [place]: [phone]  [place]: [phone]  [phone]     Prescription Assistance    If you need assistance with your prescriptions (cost, coverage, etc) please contact: [employer] Prescription Assistance Program [phone]    Important Scheduling Information    Appointments for skin testing: Appointment will last approximately 45 minutes.  Please call the appointment line for your clinic to schedule.  Discontinue oral antihistamines 7 days prior to the appointment.  Discontinue nasal and ocular antihistamines 4 days prior to appointment.    Appointments for challenges (oral food challenge, penicillin testing, aspirin desensitization) If your provider instructs you to that this additional testing is indicated, it will need to be scheduled directly through the allergy department.  Please contact them via [employer] or by calling the clinic and asking to speak with the allergy team.  They will provide additional information and instructions for the appointment.  Discontinue oral antihistamines 7 days prior to the appointment.  Discontinue nasal and ocular antihistamines 4 days prior to the appointment.    Thank you for trusting us with your care. Please feel free to contact us with any questions or concerns you may have.

## 2022-05-25 NOTE — LETTER
5/25/2022         RE: Rachael Elder  [address] [address] [address]        Dear Colleague,    Thank you for referring your patient, Rachael Elder, to the [facility]. Please see a copy of my visit note below.    SUBJECTIVE:                                                                 Rachael Elder is a 47 year old female presenting today to our Allergy Clinic at  [facility] for penicillin testing.   Today, she is in good health.  No recent urticaria, angioedema, vomiting, nausea, diarrhea, wheezing, or persistent rhinoconjunctivitis symptoms.             Patient Active Problem List   Diagnosis     Phobia, flying       Past Medical History:   Diagnosis Date     NO ACTIVE PROBLEMS       Problem (# of Occurrences) Relation (Name,Age of Onset)    Asthma (1) Son    Cancer (1) Mother: Lung    Diabetes (1) Mother    Lipids (1) Mother       Negative family history of: Coronary Artery Disease, Hyperlipidemia, Breast Cancer, Other Cancer, Substance Abuse, Osteoporosis, Depression, Mental Illness, Colon Cancer, Prostate Cancer, Cerebrovascular Disease, Hypertension        Past Surgical History:   Procedure Laterality Date     LITHOTRIPSY  4/2009    LASER--Renal stone treaatment -- ?right     Social History     Socioeconomic History     Marital status:      Spouse name: None     Number of children: 3     Years of education: None     Highest education level: None   Occupational History     Comment: Registered Dietitian   Tobacco Use     Smoking status: Never Smoker     Smokeless tobacco: Never Used   Vaping Use     Vaping Use: Never used   Substance and Sexual Activity     Alcohol use: No     Drug use: No     Sexual activity: Yes     Partners: Male     Birth control/protection: Surgical     Comment: vasectomy   Other Topics Concern     Parent/sibling w/ CABG, MI or angioplasty before 65F 55M? No   Social History Narrative    ENVIRONMENTAL HISTORY: [place]  [place] The home is heated with a forced air and gas furnace. They do have central air conditioning. The patient's bedroom is furnished with carpeting in bedroom.  Pets inside the house include 0 pets. There is no history of cockroach or mice infestation. There is/are 0 smokers in the house.  The house does not have a damp basement.            Review of Systems   Constitutional: Negative for activity change, chills and fever.   HENT: Negative for congestion, ear discharge, facial swelling, nosebleeds, postnasal drip, rhinorrhea, sinus pressure and sneezing.    Eyes: Negative for discharge, redness and itching.   Respiratory: Negative for cough, chest tightness, shortness of breath and wheezing.    Cardiovascular: Negative for chest pain.   Gastrointestinal: Negative for diarrhea, nausea and vomiting.   Musculoskeletal: Negative for arthralgias and myalgias.   Skin: Negative for rash.   Allergic/Immunologic: Negative for environmental allergies.   Neurological: Negative for headaches.   Hematological: Negative for adenopathy.   Psychiatric/Behavioral: Negative for behavioral problems and self-injury.           Current Outpatient Medications:      acetaminophen (TYLENOL) 325 MG tablet, Take 1-2 tablets by mouth every 6 hours as needed, Disp: , Rfl:      clonazePAM (KLONOPIN) 0.5 MG tablet, Take 1 tablet (0.5 mg) by mouth 2 times daily as needed for anxiety, Disp: 10 tablet, Rfl: 0     MULTI-DAY VITAMINS OR, 1 TABLET DAILY, Disp: , Rfl:      Probiotic Product (PROBIOTIC PO), Take  by mouth daily., Disp: , Rfl:   Immunization History   Administered Date(s) Administered     Influenza (IIV3) PF 11/22/2005, 11/16/2007, 10/14/2008, 10/29/2009, 11/20/2009, 10/11/2012, 10/01/2018     Influenza Vaccine IM > 6 months Valent IIV4 (Alfuria,Fluzone) 10/13/2014, 10/10/2017, 10/01/2018, 09/16/2019, 10/15/2020     MMR 07/28/1995     Mantoux Tuberculin Skin Test 07/28/1995     TD (ADULT, 7+)  "08/29/1997, 05/01/2007     TDAP Vaccine (Adacel) 06/28/2018     Tdap (Adacel,Boostrix) 03/13/2007     Allergies   Allergen Reactions     Codeine Nausea and Vomiting     Macrobid [Nitrofurantoin Monohydrate Macrocrystals]      Rash, Hives     Penicillin [Penicillins]      Rash     OBJECTIVE:                                                                 /66   Pulse 77   Ht 1.515 m (4' 11.65\")   Wt 48.5 kg (107 lb)   SpO2 100%   BMI 21.14 kg/m          Physical Exam  Vitals and nursing note reviewed.   Constitutional:       General: She is not in acute distress.     Appearance: She is not ill-appearing, toxic-appearing or diaphoretic.   HENT:      Head: Normocephalic and atraumatic.      Right Ear: Tympanic membrane, ear canal and external ear normal.      Left Ear: Tympanic membrane, ear canal and external ear normal.      Nose: No congestion or rhinorrhea.      Right Turbinates: Not enlarged, swollen or pale.      Left Turbinates: Not enlarged, swollen or pale.      Mouth/Throat:      Lips: Pink.      Mouth: Mucous membranes are moist.      Pharynx: Oropharynx is clear. No pharyngeal swelling, oropharyngeal exudate, posterior oropharyngeal erythema or uvula swelling.   Eyes:      General:         Right eye: No discharge.         Left eye: No discharge.      Conjunctiva/sclera: Conjunctivae normal.   Cardiovascular:      Rate and Rhythm: Normal rate and regular rhythm.      Heart sounds: Normal heart sounds.   Pulmonary:      Effort: Pulmonary effort is normal. No respiratory distress.      Breath sounds: Normal breath sounds and air entry. No stridor, decreased air movement or transmitted upper airway sounds. No decreased breath sounds, wheezing, rhonchi or rales.   Neurological:      Mental Status: She is alert and oriented to person, place, and time.   Psychiatric:         Mood and Affect: Mood normal.         Behavior: Behavior normal.               WORKUP:     Instructions:  1. In quick sequence, " apply prick skin tests with penicillin reagents, plus positive and negative controls.  2. If prick test is negative or equivocal, apply Penicillin intradermal tests (2-3mm blebs) in duplicate along with diluent or saline control  3. Read intradermal tests at time of placement and after 15 minutes  4. Read skin prick testing 15 minutes after placement  Product Lot #/Exp. Date Time Placed Skin Prick  W (mm) F (mm) Time Placed Intradermal #1  W (mm) F (mm) Intradermal #2  W (mm) F (mm)  Results  +/-       Time Read      Pre-Pen  0.02 ml of 6X10-5 molar [id number]  9/30/22 749 - - 810 3 - 4 - -        825 3 - 3 -    Penicillin G  10,000 units/ ml [id number]  2/28/23 749 - - 811 4 - 5 - -        826 3 - - -    Diluent/Control [id number]  9/1/22 749 - - 808 5 -  -               Histamine Food trays 749 5 7  +     Criteria:  1. Positive prick skin test:  Induration >3mm greater than control.  2. Positive ID skin test:  Significant increase in size of original bleb with wheal diameter 3mm or larger than control.  3. Negative ID skin test:  No increase in size of original bleb and no reaction greater than control site.  4. Equivocal ID skin test: Wheal only slightly larger than initial injection bleb and control site, with or without erythematous flare OR duplicates are discordant.  5. Control site: If wheal >2-3mm after 15 min, repeat skin test to look for dermatographism.  Oral Challenge  Amoxicillin  250mg chewable tablet  or  250mg/5mL oral suspension Lot #/Exp. Dose Time of Ingestion Time of Vitals BP Pulse  (bpm) Signs/Symptoms Result  +/-     Dose (125mg)  Wait 30 minutes [id number]  4/30/23 1.   Tablet  2. 2.5mL 834 906 100/68 70 - -   Full dose (375mg)  Wait 60 minutes [id number]  4/30/23 1.  1   Tablets  2.  7.5mL 929 1035 100/62 65 - -         After explaining advantages and disadvantages, including the risks of oral challenge, and signing the consent, we proceeded with testing.      Percutaneous skin puncture testing for Pre-Pen  and pen G was negative with appropriate responses to positive and negative controls. Intradermal testing for Pre-Pen and pen G, in duplicates, was also negative.     The patient tolerated graded Amoxicillin oral challenge. She was monitored for 1 hour after last dose.        Challenge START TIME: 8:34 am     END TIME: 10:35 am    Total duration of the procedure: 2 hours and 1 minute     ASSESSMENT/PLAN:      H/O allergy to penicillin    No evidence of an IgE sensitization to penicillins.  If everything is fine, the next day, asked to call us with an update. At that time will remove penicillin from the allergy list.    - [place] ALLG TEST PERQ & IC DRUG/BIOL IMMED REACT W/ I&R  - [place] INGESTION CHALLENGE TEST INITIAL 120 MINUTES        Thank you for allowing us to participate in the care of this patient. Please feel free to contact us if there are any questions or concerns about the patient.    Disclaimer: This note consists of symbols derived from keyboarding, dictation and/or voice recognition software. As a result, there may be errors in the script that have gone undetected. Please consider this when interpreting information found in this chart.    Noman Hsu MD, FAAAAI, FACAAI  Allergy and Asthma     [facility]       Again, thank you for allowing me to participate in the care of your patient.        Sincerely,        Noman Hsu MD

## 2022-05-26 ENCOUNTER — MYC MEDICAL ADVICE (OUTPATIENT)
Dept: ALLERGY | Facility: OTHER | Age: 47
End: 2022-05-26
Payer: COMMERCIAL

## 2022-05-26 NOTE — TELEPHONE ENCOUNTER
Sounds good.  Thank you for letting us know.  I will remove penicillin from the allergy list.    Noman Hsu MD

## 2022-09-28 ENCOUNTER — OFFICE VISIT (OUTPATIENT)
Dept: OBGYN | Facility: OTHER | Age: 47
End: 2022-09-28
Payer: COMMERCIAL

## 2022-09-28 VITALS
SYSTOLIC BLOOD PRESSURE: 103 MMHG | WEIGHT: 108.5 LBS | HEART RATE: 81 BPM | BODY MASS INDEX: 21.44 KG/M2 | DIASTOLIC BLOOD PRESSURE: 66 MMHG | OXYGEN SATURATION: 98 %

## 2022-09-28 DIAGNOSIS — R10.2 PELVIC PAIN IN FEMALE: ICD-10-CM

## 2022-09-28 DIAGNOSIS — R30.0 DYSURIA: Primary | ICD-10-CM

## 2022-09-28 DIAGNOSIS — N89.8 VAGINAL DRYNESS: ICD-10-CM

## 2022-09-28 LAB
ALBUMIN UR-MCNC: NEGATIVE MG/DL
APPEARANCE UR: CLEAR
BILIRUB UR QL STRIP: NEGATIVE
COLOR UR AUTO: YELLOW
GLUCOSE UR STRIP-MCNC: NEGATIVE MG/DL
HGB UR QL STRIP: NEGATIVE
KETONES UR STRIP-MCNC: NEGATIVE MG/DL
LEUKOCYTE ESTERASE UR QL STRIP: NEGATIVE
NITRATE UR QL: NEGATIVE
PH UR STRIP: 7 [PH] (ref 5–7)
RBC #/AREA URNS AUTO: ABNORMAL /HPF
SP GR UR STRIP: 1.02 (ref 1–1.03)
SQUAMOUS #/AREA URNS AUTO: ABNORMAL /LPF
UROBILINOGEN UR STRIP-ACNC: 0.2 E.U./DL
WBC #/AREA URNS AUTO: ABNORMAL /HPF

## 2022-09-28 PROCEDURE — 81001 URINALYSIS AUTO W/SCOPE: CPT | Performed by: OBSTETRICS & GYNECOLOGY

## 2022-09-28 PROCEDURE — 99213 OFFICE O/P EST LOW 20 MIN: CPT | Performed by: OBSTETRICS & GYNECOLOGY

## 2022-09-28 RX ORDER — ESTRADIOL 0.1 MG/G
CREAM VAGINAL
Qty: 42.5 G | Refills: 1 | Status: SHIPPED | OUTPATIENT
Start: 2022-09-28 | End: 2023-07-21

## 2022-09-28 NOTE — PROGRESS NOTES
SUBJECTIVE:       I spent 25 minutes in the care of Rachael on the day of service including 20 minutes of face-to-face time with remainder in chart review, care coordination, documentation on the date of service                                              Rachael Elder is a 47 year old female who presents to clinic today for the following health issue(s):  Patient presents with:  Consult    Patient is a 47-year-old P3 who noted that she has had for a few days now a slight pelvic discomfort and a slight sense of burning.  She says that similar to what she had last month at the time of her cycle but states that to some degree this burning seems to always be there, worse with standing and worse with sitting but better with lying down.      History:  She is sexually active but does not note any particular changes in pain that endorsed for a day or 2 after being sexually active but does complain that this since goes on for a week and more    Review of systems:  Is negative for GI issues in terms of constipation diarrhea pain with bowel movements  Review was also positive for occasional discharge but none currently she does note that she has vaginal dryness and occasionally a sense of pressure.  She notes no descensus of pelvic organs that seems to be falling down or being pushed back up.  Review of systems for bladder is negative to frequency nocturia dysuria or back pain.  But she does note that she feels her the sense of pressure with her bladder or just at least an unusual feeling.    Past OB history:   19, 17, 13 years ago.  Uncomplicated normal deliveries    Past medical history:  Is negative    Past surgical history:  She has had some kidney stones that were removed 12 years ago    Urinalysis was done today and is completely normal.    Examination:  External genitalia are normal  There is a slight reddening of the inner labia on the right side no signs of trauma.  Vaginal mucosa is normal  There is a  minimal discharge  Half speculum exam does not reveal any descensus of bladder bowel or uterus.    Assessment:  47-year-old with a feeling of discomfort that is not evidenced initial lab work or examination as to where it might be.  For that there could be some pelvic relaxation accounting for this as she notes that it is worse with standing and sitting but not so bad with lying down.  She has had a history of kidney stones in her urine in the past 5 years has been significantly abnormal.  It is perfectly normal at this point.  We talked about physical and physiological changes with age suggesting that vaginal atrophy and dryness tend to be issue that comes up frequently at her age range and that it would not be unusual that dryness and being sexually active can cause sustained discomfort.  She notes that she is in a monogamous relationship and no particular issues that should be evaluated for STD concerns.    Plan:  Pelvic ultrasound is ordered.  I gave that is an option if she wants to pursue this further to see if there is any organic issues contributing to her discomfort  Suggest that she try Estrace cream once a week for the next month and see if that does not improve the discomfort.  She was given a prescription of triamcinolone for eczema but that has not been used and nor does there seem to be a significant need for application pelvis.  Will send her results from her findings and follow as she finds need.      Patient's last menstrual period was 2022 (approximate)..     Patient is sexually active, .  Using vasectomy for contraception.    reports that she has never smoked. She has never used smokeless tobacco.    STD testing offered?  Declined    Health maintenance updated:  yes    Today's PHQ-2 Score:   PHQ-2 (  [employer]) 2022   Q1: Little interest or pleasure in doing things 0   Q2: Feeling down, depressed or hopeless 0   PHQ-2 Score 0   PHQ-2 Total Score (12-17 Years)- Positive if 3 or  more points; Administer PHQ-A if positive -   Q1: Little interest or pleasure in doing things Not at all   Q2: Feeling down, depressed or hopeless Not at all   PHQ-2 Score 0     Today's PHQ-9 Score:   PHQ-9 SCORE 2/25/2019   PHQ-9 Total Score -   PHQ-9 Total Score 0     Today's ELISA-7 Score:   ELISA-7 SCORE 4/7/2017   Total Score -   Total Score 2       Problem list and histories reviewed & adjusted, as indicated.  Additional history: as documented.    Patient Active Problem List   Diagnosis     Phobia, flying     Past Surgical History:   Procedure Laterality Date     LITHOTRIPSY  4/2009    LASER--Renal stone treaatment -- ?right      Social History     Tobacco Use     Smoking status: Never Smoker     Smokeless tobacco: Never Used   Substance Use Topics     Alcohol use: Yes     Comment: occ      Problem (# of Occurrences) Relation (Name,Age of Onset)    Asthma (1) Son    Cancer (1) Mother: Lung    Diabetes (1) Mother    Lipids (1) Mother       Negative family history of: Coronary Artery Disease, Hyperlipidemia, Breast Cancer, Other Cancer, Substance Abuse, Osteoporosis, Depression, Mental Illness, Colon Cancer, Prostate Cancer, Cerebrovascular Disease, Hypertension            Current Outpatient Medications   Medication Sig     acetaminophen (TYLENOL) 325 MG tablet Take 1-2 tablets by mouth every 6 hours as needed     clonazePAM (KLONOPIN) 0.5 MG tablet Take 1 tablet (0.5 mg) by mouth 2 times daily as needed for anxiety     MULTI-DAY VITAMINS OR 1 TABLET DAILY     Probiotic Product (PROBIOTIC PO) Take  by mouth daily.     No current facility-administered medications for this visit.     Allergies   Allergen Reactions     Codeine Nausea and Vomiting     Macrobid [Nitrofurantoin Monohydrate Macrocrystals]      Rash, Hives         OBJECTIVE:     /66 (BP Location: Left arm, Patient Position: Sitting, Cuff Size: Adult Regular)   Pulse 81   Wt 49.2 kg (108 lb 8 oz)   LMP 08/28/2022 (Approximate)   SpO2 98%   BMI  21.44 kg/m    Body mass index is 21.44 kg/m .    Exam:  See above     In-Clinic Test Results:  No results found for this or any previous visit (from the past 24 hour(s)).    ASSESSMENT/PLAN:                                                      As above    Nixon Real MD  [facility]

## 2022-10-12 ENCOUNTER — ANCILLARY PROCEDURE (OUTPATIENT)
Dept: ULTRASOUND IMAGING | Facility: OTHER | Age: 47
End: 2022-10-12
Attending: OBSTETRICS & GYNECOLOGY
Payer: COMMERCIAL

## 2022-10-12 DIAGNOSIS — R30.0 DYSURIA: ICD-10-CM

## 2022-10-12 PROCEDURE — 76830 TRANSVAGINAL US NON-OB: CPT | Mod: TC | Performed by: RADIOLOGY

## 2022-10-12 PROCEDURE — 76856 US EXAM PELVIC COMPLETE: CPT | Mod: TC | Performed by: RADIOLOGY

## 2023-01-07 ENCOUNTER — HEALTH MAINTENANCE LETTER (OUTPATIENT)
Age: 48
End: 2023-01-07

## 2023-03-02 ENCOUNTER — ANCILLARY PROCEDURE (OUTPATIENT)
Dept: MAMMOGRAPHY | Facility: CLINIC | Age: 48
End: 2023-03-02
Attending: OBSTETRICS & GYNECOLOGY
Payer: COMMERCIAL

## 2023-03-02 DIAGNOSIS — Z12.31 VISIT FOR SCREENING MAMMOGRAM: ICD-10-CM

## 2023-03-02 PROCEDURE — 77063 BREAST TOMOSYNTHESIS BI: CPT | Mod: GC

## 2023-03-02 PROCEDURE — 77067 SCR MAMMO BI INCL CAD: CPT | Mod: GC

## 2023-03-06 ENCOUNTER — OFFICE VISIT (OUTPATIENT)
Dept: OBGYN | Facility: OTHER | Age: 48
End: 2023-03-06
Payer: COMMERCIAL

## 2023-03-06 VITALS
DIASTOLIC BLOOD PRESSURE: 73 MMHG | BODY MASS INDEX: 21.07 KG/M2 | SYSTOLIC BLOOD PRESSURE: 118 MMHG | HEIGHT: 60 IN | WEIGHT: 107.3 LBS

## 2023-03-06 DIAGNOSIS — I83.812 VARICOSE VEINS OF LEFT LOWER EXTREMITY WITH PAIN: ICD-10-CM

## 2023-03-06 DIAGNOSIS — Z00.00 ANNUAL PHYSICAL EXAM: Primary | ICD-10-CM

## 2023-03-06 DIAGNOSIS — N92.1 METRORRHAGIA: ICD-10-CM

## 2023-03-06 PROCEDURE — 99396 PREV VISIT EST AGE 40-64: CPT | Performed by: OBSTETRICS & GYNECOLOGY

## 2023-03-06 NOTE — PATIENT INSTRUCTIONS
PREVENTIVE HEALTH RECOMMENDATIONS:   Get a physical every year.  A pap test is important to have done starting at age 21 and then every three years as long as your pap is normal.  When you receive the results of your pap test it will include when you need to have your next pap test.    You should be tested each year for chlamydia and gonorrhea if you are aged 16-25 and if you have had a new sexual partner since you were last tested.   Vaccines: Get a flu shot each year.   Eat at least 8-10 servings of fruits and vegetables daily.  Eat whole-grain bread and cereal, whole-wheat pasta and brown rice instead of white grains and white rice.   For bone health: Eat calcium-rich foods (dairy products) or take calcium pills (500 to 600 mg with vitamin D) twice a day with food.   Exercise for an average of 30 minutes a day, 5 days of the week. It can be as simple as taking a brisk walk.  This will help you control your weight and prevent many diseases.   Limit alcohol to one drink per day.   Don't smoke and limit your exposure to second hand smoke.  If you smoke consider making a plan to quit. Go to [employer] and clink on   [employer]  for help   Wear sunscreen with at least SPF15 to prevent skin damage and skin cancer.   Brush your teeth twice a day and floss once a day and see your dentist twice a year for an exam and cleaning.   Have a great year and I will look forward to seeing you next year.   Martha Garcia DO    If you have labs or imaging done, the results will automatically release in [employer] without an interpretation.  Your health care professional will review those results and send an interpretation with recommendations as soon as possible, but this may be 1-3 business days.    If you have any questions regarding your visit, please contact your care team.     [facility]: [phone]  Women s Health CLINIC HOURS TELEPHONE NUMBER       DO Reshma Blackman   MARILOU Escobar-MANUELA Lindsey-MANUELA Michel-MANUELA Mendoza-  Janna-     Monday- [facility]  8:00 a.m - 5:00 p.m    Tuesday- [place]  8:00 a.m - 5:00 p.m    Wednesday- OFF    Thursday- Surgery     Friday- [place]  8:00 a.m - 5:00 p.m. [facility]  [address]  [place] [address]  [phone] Fax  Imaging [phone]    [facility]  [address] [place] [address] [place] [address]  [phone]    [address] [place] [address]  [phone] Fax  Imaging [phone]     Urgent Care locations:  [facility] Monday-Friday  10 am - 8 pm  Saturday and Sunday   9 am - 5 pm  Monday-Friday   10 am - 8 pm  Saturday and Sunday   9 am - 5 pm   [phone]     If you need a medication refill, please contact your pharmacy. Please allow 3 business days for your refill to be completed.    As always, thank you for trusting us with your healthcare needs!

## 2023-03-06 NOTE — PROGRESS NOTES
Subjective  Rachael Elder is a 48 year old female who presents for her annual exam.  Patient states she is doing well.  Her menses have become somewhat irregular over the last few months.  She was always very irregular getting a cycle every month and she skipped a cycle from November to February.  Her cycles used to be fairly light however this last cycle in February was very heavy.  Patient uses pads and rarely has to change them.  No problems urinating.  Irregular bowel movements for the last year.  Some days she will have a few bowel movement and other times she goes a week without.  Family history of IBS and diverticulosis.  Patient is sexually active.  Some dyspareunia but this is better when she uses lubrication.  No vaginal spotting after.  3 NSVDs.  Her  has had a vasectomy.    Patient complains of painful varicose veins on her left leg.  I recommended she follow-up at the vein clinic.    Most recent pap: 2020  History of abnormal Pap smear:  No  History of STI's:   No  History of PID:  No    Family history of uterine cancer:  No  Family history of ovarian cancer:  No  Family history of colon cancer:   No  Family history of breast cancer:  No    ROS  ROS: 10 point ROS neg other than the symptoms noted above in the HPI.    Past Medical History:   Diagnosis Date     NO ACTIVE PROBLEMS      Past Surgical History:   Procedure Laterality Date     LITHOTRIPSY  4/2009    LASER--Renal stone treaatment -- ?right     Family History   Problem Relation Age of Onset     Diabetes Mother      Lipids Mother      Cancer Mother         Lung     Asthma Son      Coronary Artery Disease No family hx of      Hyperlipidemia No family hx of      Breast Cancer No family hx of      Other Cancer No family hx of      Substance Abuse No family hx of      Osteoporosis No family hx of      Depression No family hx of      Mental Illness No family hx of      Colon Cancer No family hx of      Prostate Cancer No family hx of       Cerebrovascular Disease No family hx of      Hypertension No family hx of      Social History     Tobacco Use     Smoking status: Never     Smokeless tobacco: Never   Substance Use Topics     Alcohol use: Yes     Comment: occ       Tobacco abuse:  No  Do you get at least three servings of calcium containing foods daily (dairy, green leafy vegetables, etc.)? yes   Outside of work or daily activities, how many days per week do you exercise for 30 minutes or longer? 3-4x per week  The patient does not drink >3 drinks per day nor >7 drinks per week.   Have you had an eye exam in the past two years? yes   Do you see a dentist twice per year? yes   Today's PHQ-2 Score:   Abuse: Current or Past(Physical, Sexual or Emotional)- No   Do you feel safe in your environment - Yes  Objective  Vitals: /73 (BP Location: Left arm, Cuff Size: Adult Regular)   Ht 1.524 m (5')   Wt 48.7 kg (107 lb 4.8 oz)   LMP 02/11/2023 (Approximate)   BMI 20.96 kg/m    BMI= Body mass index is 20.96 kg/m .    General appearance=well developed, well-nourished female  Gait=normal  Psych=mood is stable, alert and oriented x3  HEENT=mucous membranes moist  Skin=no rashes or lesions seen,normal turgor   Breast:  Benign exam.  No masses noted.  Non tender. No skin changes, retraction, or nipple discharge.  Axilla feel completely normal, no lymph node enlargement and non-tender.  Neck=overall appearance is normal  Heart=RRR, no murmurs, no swelling noted  Thyroid=normal, no masses, no TTP, no enlargement  Lungs=non-labored breathing, no use of accessory muscles, clear to ausculation bilaterally  Abd=soft, Nontender/nondistended, +bowel sounds x4, no masses, no signs of hernias, no evidence of hepatosplenomegaly  PELVIC:    External genitalia: normal without lesions or masses  Urethral meatus: no lesions or prolapse noted, normal size  Urethra: no masses, non tender  Bladder: non tender, no fullness  Vagina: normal mucosa and rugae, no  discharge.  Cervix: normal without lesion, no cervical motion tenderness, healthy, multiparous  Uterus: small, mobile, nontender.  Adnexa: non tender, without masses  Rectal: deferred  Ext=no clubbing or cyanosis, no swelling      Last lipid profile:  2021  Regular self breast exam:  Yes  Most recent mammogram:  A few days ago  History of abnormal mammogram:  No  Fit testing:  N/A  DEXA:  N/A        Assessment/Plan  1.)  Annual/well woman exam=CBC, CMP, lipids, TSH  2.)  Irregular bowel movements=colonosocopy   3.)  Metrorrhagia= suspect perimenopausal  4.)  Varicose veins=referral with General Surgery=I was told Dr. Humphrey does this      The following topics were discussed or recommended   Discussed seat belt, helmet and sunscreen use  Vision screening   Calcium/Vitamin D supplement=Recommended 1000 mg of calcium daily and 800 IU of vitamin D.    30 minutes were spent on the date of the encounter doing chart review, history and exam, documentation, and further activities as noted above.        Martha Garcia, DO

## 2023-03-07 ENCOUNTER — HOSPITAL ENCOUNTER (OUTPATIENT)
Facility: AMBULATORY SURGERY CENTER | Age: 48
End: 2023-03-07
Attending: FAMILY MEDICINE | Admitting: FAMILY MEDICINE
Payer: COMMERCIAL

## 2023-03-07 ENCOUNTER — TELEPHONE (OUTPATIENT)
Dept: GASTROENTEROLOGY | Facility: CLINIC | Age: 48
End: 2023-03-07
Payer: COMMERCIAL

## 2023-03-07 NOTE — TELEPHONE ENCOUNTER
Screening Questions  BLUE  KIND OF PREP RED  LOCATION [review exclusion criteria] GREEN  SEDATION TYPE        Y Are you active on mychart?       HANNAH SPIVEY Ordering/Referring Provider?        [employer] What type of coverage do you have?      N Have you had a positive covid test in the last 14 days?     20.9 1. BMI  [BMI 40+ - review exclusion criteria]    Y  2. Are you able to give consent for your medical care? [IF NO,RN REVIEW]          N  3. Are you taking any prescription pain medications on a routine schedule   (ex narcotics: oxycodone, roxicodone, oxycontin,  and percocet)? [RN Review]          3a. EXTENDED PREP What kind of prescription?     N 4. Do you have any chemical dependencies such as alcohol, street drugs, or methadone?        **If yes 3- 5 , please schedule with MAC sedation.**          IF YES TO ANY 6 - 10 - HOSPITAL SETTING ONLY.     N 6.   Do you need assistance transferring?     N 7.   Have you had a heart or lung transplant?    N 8.   Are you currently on dialysis?   N 9.   Do you use daily home oxygen?   N 10. Do you take nitroglycerin?   10a.  If yes, how often?     11. [FEMALES]  N Are you currently pregnant?    11a.  If yes, how many weeks? [ Greater than 12 weeks, OR NEEDED]    N 12. Do you have Pulmonary Hypertension? *NEED PAC APPT AT [facility] w/ MAC*     N 13. [review exclusion criteria]  Do you have any implantable devices in your body (pacemaker, defib, LVAD)?    N 14. In the past 6 months, have you had any heart related issues including cardiomyopathy or heart attack?     14a.  If yes, did it require cardiac stenting if so when?     N 15. Have you had a stroke or Transient ischemic attack (TIA - aka  mini stroke ) within 6 months?      N 16. Do you have mod to severe Obstructive Sleep Apnea?  [Hospital only]    N 17. Do you have SEVERE AND UNCONTROLLED asthma? *NEED PAC APPT AT [facility] w/MAC*     18. Are you currently taking any blood thinners?     18a. No. Continue to  "19.   18b.    N 19. Do you take the medication Phentermine?    19a. If yes, \"Hold for 7 days before procedure.  Please consult your prescribing provider if you have questions about holding this medication.\"     N  20. Do you have chronic kidney disease?      N  21. Do you have a diagnosis of diabetes?     N  22. On a regular basis do you go 3-5 days between bowel movements?      23. Preferred LOCAL Pharmacy for Pre Prescription    [ LIST ONLY ONE PHARMACY]     [id number] [address] AT [facility] [place]  & MAIN        - CLOSING REMINDERS -    Informed patient they will need an adult    Cannot take any type of public or medical transportation alone    Conscious Sedation- Needs  for 6 hours after the procedure       MAC/General-Needs  for 24 hours after procedure    Pre-Procedure Covid test to be completed [[facility] PCR Testing Required]    Confirmed Nurse will call to complete assessment       - SCHEDULING DETAILS -  N Hospital Setting Required? If yes, what is the exclusion?:    SONU  Surgeon    5/12  Date of Procedure  Lower Endoscopy [Colonoscopy]  Type of Procedure Scheduled  [facility]   STANDARD [facility]-If you answer yes to questions #8, #20, #21Which Colonoscopy Prep was Sent?     CS Sedation Type     N PAC / Pre-op Required                 "

## 2023-03-22 ENCOUNTER — MYC REFILL (OUTPATIENT)
Dept: OBGYN | Facility: OTHER | Age: 48
End: 2023-03-22
Payer: COMMERCIAL

## 2023-03-22 DIAGNOSIS — F40.243 PHOBIA, FLYING: ICD-10-CM

## 2023-03-22 NOTE — TELEPHONE ENCOUNTER
"clonazePAM (KLONOPIN) 0.5 MG tablet was prescribed for pt on 2/7/22 for #10 tablets with 0 refills by Dr. Garcia.  It appears it was prescribed for \"fear of flying\".    Pt last saw Dr. Garcia for a yearly physical on 3/6/23.    Pt states she has 6 pills left but states the prescription expires on 2/7/23 and she typically gets it renewed every year.    Routing to Dr. Garcia to approve if appropriate.       Rosalia Chan RN          "

## 2023-03-29 ENCOUNTER — LAB (OUTPATIENT)
Dept: LAB | Facility: OTHER | Age: 48
End: 2023-03-29
Payer: COMMERCIAL

## 2023-03-29 ENCOUNTER — MYC MEDICAL ADVICE (OUTPATIENT)
Dept: OBGYN | Facility: OTHER | Age: 48
End: 2023-03-29

## 2023-03-29 DIAGNOSIS — R17 ELEVATED BILIRUBIN: Primary | ICD-10-CM

## 2023-03-29 DIAGNOSIS — Z00.00 ANNUAL PHYSICAL EXAM: ICD-10-CM

## 2023-03-29 LAB
ALBUMIN SERPL BCG-MCNC: 4.2 G/DL (ref 3.5–5.2)
ALP SERPL-CCNC: 67 U/L (ref 35–104)
ALT SERPL W P-5'-P-CCNC: 13 U/L (ref 10–35)
ANION GAP SERPL CALCULATED.3IONS-SCNC: 7 MMOL/L (ref 7–15)
AST SERPL W P-5'-P-CCNC: 21 U/L (ref 10–35)
BILIRUB SERPL-MCNC: 1.3 MG/DL
BUN SERPL-MCNC: 14.3 MG/DL (ref 6–20)
CALCIUM SERPL-MCNC: 9.1 MG/DL (ref 8.6–10)
CHLORIDE SERPL-SCNC: 101 MMOL/L (ref 98–107)
CHOLEST SERPL-MCNC: 155 MG/DL
CREAT SERPL-MCNC: 0.59 MG/DL (ref 0.51–0.95)
DEPRECATED HCO3 PLAS-SCNC: 30 MMOL/L (ref 22–29)
ERYTHROCYTE [DISTWIDTH] IN BLOOD BY AUTOMATED COUNT: 12.3 % (ref 10–15)
GFR SERPL CREATININE-BSD FRML MDRD: >90 ML/MIN/1.73M2
GLUCOSE SERPL-MCNC: 91 MG/DL (ref 70–99)
HCT VFR BLD AUTO: 40.2 % (ref 35–47)
HDLC SERPL-MCNC: 65 MG/DL
HGB BLD-MCNC: 13.3 G/DL (ref 11.7–15.7)
LDLC SERPL CALC-MCNC: 81 MG/DL
MCH RBC QN AUTO: 29 PG (ref 26.5–33)
MCHC RBC AUTO-ENTMCNC: 33.1 G/DL (ref 31.5–36.5)
MCV RBC AUTO: 88 FL (ref 78–100)
NONHDLC SERPL-MCNC: 90 MG/DL
PLATELET # BLD AUTO: 249 10E3/UL (ref 150–450)
POTASSIUM SERPL-SCNC: 4.2 MMOL/L (ref 3.4–5.3)
PROT SERPL-MCNC: 6.9 G/DL (ref 6.4–8.3)
RBC # BLD AUTO: 4.59 10E6/UL (ref 3.8–5.2)
SODIUM SERPL-SCNC: 138 MMOL/L (ref 136–145)
TRIGL SERPL-MCNC: 45 MG/DL
TSH SERPL DL<=0.005 MIU/L-ACNC: 1.24 UIU/ML (ref 0.3–4.2)
WBC # BLD AUTO: 6 10E3/UL (ref 4–11)

## 2023-03-29 PROCEDURE — 85027 COMPLETE CBC AUTOMATED: CPT

## 2023-03-29 PROCEDURE — 36415 COLL VENOUS BLD VENIPUNCTURE: CPT

## 2023-03-29 PROCEDURE — 80061 LIPID PANEL: CPT

## 2023-03-29 PROCEDURE — 84443 ASSAY THYROID STIM HORMONE: CPT

## 2023-03-29 PROCEDURE — 80053 COMPREHEN METABOLIC PANEL: CPT

## 2023-03-29 RX ORDER — CLONAZEPAM 0.5 MG/1
0.5 TABLET ORAL 2 TIMES DAILY PRN
Qty: 10 TABLET | Refills: 0 | Status: SHIPPED | OUTPATIENT
Start: 2023-03-29

## 2023-03-29 NOTE — TELEPHONE ENCOUNTER
Pt had a CMP drawn today, 3/29.  Per result note from Dr. Garcia: bilirubin is very slightly elevated.  Dr. Garcia recommends pt to repeat test in a month.    Pt is writing in stating her bilirubin has been elevated for years so she doesn't think it will be much different when rechecking in a month.  She is wondering if there is anything further that needs to be tested/evaluated instead of checking her bilirubin again.  She states she is not jaundice or does not have any concerning symptoms so is unsure why it continues to be elevated.    Routing to Dr. Garcia to further advise on.    Rosalia Chan RN

## 2023-03-30 NOTE — TELEPHONE ENCOUNTER
I recommend patient follow-up with family practice for this.  Sometimes it can be due to a liver problem or a gallbladder issue.  This is more under the scope of family practice.  She does not need to do the blood work again if she does not want to but I do recommend follow-up.    Martha Garcia, DO

## 2023-04-26 ENCOUNTER — TELEPHONE (OUTPATIENT)
Dept: GASTROENTEROLOGY | Facility: CLINIC | Age: 48
End: 2023-04-26

## 2023-04-26 DIAGNOSIS — Z00.00 ANNUAL PHYSICAL EXAM: Primary | ICD-10-CM

## 2023-04-26 RX ORDER — BISACODYL 5 MG/1
TABLET, DELAYED RELEASE ORAL
Qty: 4 TABLET | Refills: 0 | Status: SHIPPED | OUTPATIENT
Start: 2023-04-26 | End: 2023-11-08

## 2023-04-26 NOTE — TELEPHONE ENCOUNTER
Pre assessment questions completed for upcoming Colonoscopy  procedure scheduled on 05.12.2023    COVID policy reviewed.     Pre-op exam? N/A    Reviewed procedural arrival time 0730, procedure time 0815 and facility location [facility]; [address], [place] [address]    Designated  policy reviewed. Instructed to have someone stay 6 hours post procedure.     Anticoagulation/blood thinners? No    Electronic implanted devices? No    Diabetic? No    Procedure indication: Annual physical exam    Bowel prep recommendation: Standard Golytely     Reviewed procedure prep instructions.     Prep instructions sent via [employer].  Bowel prep script sent to [facility] #[id number] - [place], [place] - [address] AT [facility] OF  & MAIN.     Patient verbalized understanding and had no questions or concerns at this time.    Cierra Hoffman RN  Endoscopy Procedure Pre Assessment RN

## 2023-05-08 ENCOUNTER — TELEPHONE (OUTPATIENT)
Dept: GASTROENTEROLOGY | Facility: CLINIC | Age: 48
End: 2023-05-08
Payer: COMMERCIAL

## 2023-05-08 NOTE — TELEPHONE ENCOUNTER
Caller: Rachael Elder    Reason for Reschedule/Cancellation (please be detailed, any staff messages or encounters to note?): Patient has family emergency and will be traveling, requested to cancel. Declined rescheduling at this time, states she will call to reschedule after returning home.      Prior to reschedule please review:    Ordering Provider:Martha Garcia, DO    Sedation per order:MODERATE    Does patient have any ASC Exclusions, please identify?: NO      Notes on Cancelled Procedure:    Procedure:Lower Endoscopy [Colonoscopy]     Date: 5/12    Location:[facility] [place]; [address], [place] [address]    Surgeon: SONU        Rescheduled: No

## 2023-07-11 ENCOUNTER — TELEPHONE (OUTPATIENT)
Dept: GASTROENTEROLOGY | Facility: CLINIC | Age: 48
End: 2023-07-11
Payer: COMMERCIAL

## 2023-07-11 NOTE — TELEPHONE ENCOUNTER
Pre visit planning completed.      Procedure details:    Patient scheduled for Colonoscopy  on 07.21.2023.     Arrival time: 0900. Procedure time 0945    Pre op exam needed? N/A    Facility location: [facility] [place]; [address], [place] [address]    Sedation type: Conscious sedation     Indication for procedure: Annual physical exam      Chart review:     Electronic implanted devices? No    Diabetic? No    Diabetic medication HOLDING recommendations: (if applicable)  Oral diabetic medications: N/A  Diabetic injectables: N/A  Insulin: N/A      Medication review:    Anticoagulants? No    NSAIDS? No NSAID medications per patient's medication list.  RN will verify with pre-assessment call.    Other medication HOLDING recommendations:  N/A      Prep for procedure:     Bowel prep recommendation: Standard Golytely -discuss if patient has picked up from previously scheduled colonoscopy.  If not, could switch to miralax/gatorade    Prep instructions sent via "[employer]" Bowel prep script sent to [facility] #[id number] [place], [place] - [address] AT [facility] OF [address] & ALBINA Hoffman RN  Endoscopy Procedure Pre Assessment RN  [phone] option 4

## 2023-07-11 NOTE — TELEPHONE ENCOUNTER
Pre assessment completed for upcoming procedure.    Procedure details:    Arrival time and facility location reviewed.    Pre op exam needed? N/A    Designated  policy reviewed. Instructed to have someone stay 6 hours post procedure.     COVID policy reviewed.      Chart review:     Electronic implanted devices? No    Diabetic? No    Medication review:    Diabetic medication HOLDING recommendations: (if applicable)  Oral diabetic medications: N/A  Diabetic injectables: N/A  Insulin: N/A    Anticoagulants? No    NSAIDS? No    Other medication HOLDING recommendations:  N/A      Prep for procedure:     Reviewed procedure prep instructions.     Patient verbalized understanding and had no questions or concerns at this time.        Cierra Hoffman RN  Endoscopy Procedure Pre Assessment RN  [phone] option 4

## 2023-07-20 NOTE — TELEPHONE ENCOUNTER
Incoming call from patient.     Patient states they are confused as they received two prep instructions. One for miralax and one for Golytely. Questioned patient if they picked up Golytely script that was sent in April. Per patient they did. Advised patient to follow instructions for standard Golytely prep.     Reviewed Golytely prep instructions. Patient had no further questions or concerns at this time.     Cierra Mari RN  Endoscopy Procedure Pre Assessment RN  [phone] option 4

## 2023-07-21 ENCOUNTER — HOSPITAL ENCOUNTER (OUTPATIENT)
Facility: AMBULATORY SURGERY CENTER | Age: 48
Discharge: HOME | End: 2023-07-21
Attending: FAMILY MEDICINE | Admitting: FAMILY MEDICINE
Payer: COMMERCIAL

## 2023-07-21 VITALS
RESPIRATION RATE: 16 BRPM | SYSTOLIC BLOOD PRESSURE: 103 MMHG | DIASTOLIC BLOOD PRESSURE: 68 MMHG | HEART RATE: 96 BPM | WEIGHT: 100 LBS | OXYGEN SATURATION: 100 % | BODY MASS INDEX: 19.53 KG/M2 | TEMPERATURE: 98.5 F

## 2023-07-21 DIAGNOSIS — Z12.11 SCREEN FOR COLON CANCER: Primary | ICD-10-CM

## 2023-07-21 LAB — COLONOSCOPY: NORMAL

## 2023-07-21 PROCEDURE — G8907 PT DOC NO EVENTS ON DISCHARG: HCPCS

## 2023-07-21 PROCEDURE — G8918 PT W/O PREOP ORDER IV AB PRO: HCPCS

## 2023-07-21 PROCEDURE — G0121 COLON CA SCRN NOT HI RSK IND: HCPCS | Performed by: FAMILY MEDICINE

## 2023-07-21 PROCEDURE — 99152 MOD SED SAME PHYS/QHP 5/>YRS: CPT | Mod: 59 | Performed by: FAMILY MEDICINE

## 2023-07-21 PROCEDURE — 45378 DIAGNOSTIC COLONOSCOPY: CPT

## 2023-07-21 RX ORDER — ONDANSETRON 2 MG/ML
4 INJECTION INTRAMUSCULAR; INTRAVENOUS
Status: DISCONTINUED | OUTPATIENT
Start: 2023-07-21 | End: 2023-07-22 | Stop reason: HOSPADM

## 2023-07-21 RX ORDER — SODIUM CHLORIDE, SODIUM LACTATE, POTASSIUM CHLORIDE, CALCIUM CHLORIDE 600; 310; 30; 20 MG/100ML; MG/100ML; MG/100ML; MG/100ML
INJECTION, SOLUTION INTRAVENOUS CONTINUOUS PRN
Status: COMPLETED | OUTPATIENT
Start: 2023-07-21 | End: 2023-07-21

## 2023-07-21 RX ORDER — FENTANYL CITRATE 50 UG/ML
INJECTION, SOLUTION INTRAMUSCULAR; INTRAVENOUS PRN
Status: DISCONTINUED | OUTPATIENT
Start: 2023-07-21 | End: 2023-07-21 | Stop reason: HOSPADM

## 2023-07-21 RX ORDER — LIDOCAINE 40 MG/G
CREAM TOPICAL
Status: DISCONTINUED | OUTPATIENT
Start: 2023-07-21 | End: 2023-07-22 | Stop reason: HOSPADM

## 2023-07-21 NOTE — H&P
Pre-Endoscopy History and Physical     Rachael Elder MRN# [id number]   YOB: 1975 Age: 48 year old     Date of Procedure: 7/21/2023  Primary care provider: Clinic, [facility]  Type of Endoscopy: colonoscopy  Reason for Procedure: screening  Type of Anesthesia Anticipated: Moderate Sedation    HPI:    Rachael is a 48 year old female who will be undergoing the above procedure.      A history and physical has been performed. The patient's medications and allergies have been reviewed. The risks and benefits of the procedure and the sedation options and risks were discussed with the patient.  All questions were answered and informed consent was obtained.      She denies a personal or family history of anesthesia complications or bleeding disorders.     Allergies   Allergen Reactions     Codeine Nausea and Vomiting     Macrobid [Nitrofurantoin Monohydrate Macrocrystals]      Rash, Hives        Cannot display prior to admission medications because the patient has not been admitted in this contact.       Patient Active Problem List   Diagnosis     Phobia, flying     Metrorrhagia     Varicose veins of left lower extremity with pain        Past Medical History:   Diagnosis Date     NO ACTIVE PROBLEMS         Past Surgical History:   Procedure Laterality Date     LITHOTRIPSY  4/2009    LASER--Renal stone treaatment -- ?right       Social History     Tobacco Use     Smoking status: Never     Smokeless tobacco: Never   Substance Use Topics     Alcohol use: Yes     Comment: occ       Family History   Problem Relation Age of Onset     Diabetes Mother      Lipids Mother      Cancer Mother         Lung     Asthma Son      Coronary Artery Disease No family hx of      Hyperlipidemia No family hx of      Breast Cancer No family hx of      Other Cancer No family hx of      Substance Abuse No family hx of      Osteoporosis No family hx of      Depression No family hx of      Mental Illness No family hx of       Colon Cancer No family hx of      Prostate Cancer No family hx of      Cerebrovascular Disease No family hx of      Hypertension No family hx of        REVIEW OF SYSTEMS:     5 point ROS negative except as noted above in HPI, including Gen., Resp., CV, GI &  system review.      PHYSICAL EXAM:   /81   Pulse 94   Temp 98.5  F (36.9  C) (Temporal)   Resp 14   Wt 45.4 kg (100 lb)   LMP 07/21/2023   SpO2 100%   BMI 19.53 kg/m   Estimated body mass index is 19.53 kg/m  as calculated from the following:    Height as of 3/6/23: 1.524 m (5').    Weight as of this encounter: 45.4 kg (100 lb).   GENERAL APPEARANCE: healthy, alert and no distress  MENTAL STATUS: alert and oriented x 3  AIRWAY EXAM: Mallampatti Class II (visualization of the soft palate, fauces, and uvula)  RESP: lungs clear to auscultation - no rales, rhonchi or wheezes  CV: regular rates and rhythm and normal S1 S2, no S3 or S4      DIAGNOSTICS:    Not indicated      IMPRESSION   ASA Class 1 - Healthy patient, no medical problems        PLAN:       Plan for colonoscopy. We discussed the risks, benefits and alternatives and the patient wished to proceed.    The above has been forwarded to the consulting provider.      Signed Electronically by: Ksenia Palomo MD  July 21, 2023

## 2023-11-08 ENCOUNTER — MYC MEDICAL ADVICE (OUTPATIENT)
Dept: FAMILY MEDICINE | Facility: OTHER | Age: 48
End: 2023-11-08

## 2023-11-08 ENCOUNTER — OFFICE VISIT (OUTPATIENT)
Dept: FAMILY MEDICINE | Facility: OTHER | Age: 48
End: 2023-11-08
Payer: COMMERCIAL

## 2023-11-08 VITALS
WEIGHT: 105 LBS | TEMPERATURE: 98.4 F | DIASTOLIC BLOOD PRESSURE: 62 MMHG | BODY MASS INDEX: 20.51 KG/M2 | OXYGEN SATURATION: 99 % | HEART RATE: 65 BPM | RESPIRATION RATE: 16 BRPM | SYSTOLIC BLOOD PRESSURE: 108 MMHG

## 2023-11-08 DIAGNOSIS — R17 ELEVATED BILIRUBIN: Primary | ICD-10-CM

## 2023-11-08 DIAGNOSIS — Z87.19 HISTORY OF GALLSTONES: ICD-10-CM

## 2023-11-08 LAB
ALBUMIN SERPL BCG-MCNC: 4.3 G/DL (ref 3.5–5.2)
ALP SERPL-CCNC: 68 U/L (ref 35–104)
ALT SERPL W P-5'-P-CCNC: 12 U/L (ref 0–50)
AMYLASE SERPL-CCNC: 71 U/L (ref 28–100)
ANION GAP SERPL CALCULATED.3IONS-SCNC: 9 MMOL/L (ref 7–15)
AST SERPL W P-5'-P-CCNC: 23 U/L (ref 0–45)
BILIRUB SERPL-MCNC: 1.1 MG/DL
BUN SERPL-MCNC: 11.1 MG/DL (ref 6–20)
CALCIUM SERPL-MCNC: 9.1 MG/DL (ref 8.6–10)
CHLORIDE SERPL-SCNC: 102 MMOL/L (ref 98–107)
CREAT SERPL-MCNC: 0.56 MG/DL (ref 0.51–0.95)
DEPRECATED HCO3 PLAS-SCNC: 28 MMOL/L (ref 22–29)
EGFRCR SERPLBLD CKD-EPI 2021: >90 ML/MIN/1.73M2
GLUCOSE SERPL-MCNC: 84 MG/DL (ref 70–99)
HBV SURFACE AG SERPL QL IA: NONREACTIVE
HCV AB SERPL QL IA: NONREACTIVE
LIPASE SERPL-CCNC: 30 U/L (ref 13–60)
POTASSIUM SERPL-SCNC: 3.9 MMOL/L (ref 3.4–5.3)
PROT SERPL-MCNC: 6.9 G/DL (ref 6.4–8.3)
SODIUM SERPL-SCNC: 139 MMOL/L (ref 135–145)

## 2023-11-08 PROCEDURE — 83690 ASSAY OF LIPASE: CPT | Performed by: NURSE PRACTITIONER

## 2023-11-08 PROCEDURE — 80053 COMPREHEN METABOLIC PANEL: CPT | Performed by: NURSE PRACTITIONER

## 2023-11-08 PROCEDURE — 99204 OFFICE O/P NEW MOD 45 MIN: CPT | Performed by: NURSE PRACTITIONER

## 2023-11-08 PROCEDURE — 87340 HEPATITIS B SURFACE AG IA: CPT | Performed by: NURSE PRACTITIONER

## 2023-11-08 PROCEDURE — 36415 COLL VENOUS BLD VENIPUNCTURE: CPT | Performed by: NURSE PRACTITIONER

## 2023-11-08 PROCEDURE — 86803 HEPATITIS C AB TEST: CPT | Performed by: NURSE PRACTITIONER

## 2023-11-08 PROCEDURE — 82150 ASSAY OF AMYLASE: CPT | Performed by: NURSE PRACTITIONER

## 2023-11-08 ASSESSMENT — PAIN SCALES - GENERAL: PAINLEVEL: NO PAIN (0)

## 2023-11-08 NOTE — PROGRESS NOTES
Assessment & Plan     Elevated bilirubin  Recommend work up. Does not have any symptoms of this at this time.  Advised follow up depending on results.   - Comprehensive metabolic panel (BMP + Alb, Alk Phos, ALT, AST, Total. Bili, TP); Future  - Lipase; Future  - Amylase; Future  - US Abdomen Complete; Future  - Hepatitis C Screen Reflex to HCV RNA Quant and Genotype; Future  - Hepatitis B surface antigen; Future  - Comprehensive metabolic panel (BMP + Alb, Alk Phos, ALT, AST, Total. Bili, TP)  - Lipase  - Amylase  - Hepatitis C Screen Reflex to HCV RNA Quant and Genotype  - Hepatitis B surface antigen    History of gallstones    - Comprehensive metabolic panel (BMP + Alb, Alk Phos, ALT, AST, Total. Bili, TP); Future  - Lipase; Future  - Amylase; Future  - US Abdomen Complete; Future  - Hepatitis C Screen Reflex to HCV RNA Quant and Genotype; Future  - Hepatitis B surface antigen; Future  - Comprehensive metabolic panel (BMP + Alb, Alk Phos, ALT, AST, Total. Bili, TP)  - Lipase  - Amylase  - Hepatitis C Screen Reflex to HCV RNA Quant and Genotype  - Hepatitis B surface antigen    The patient indicates understanding of these issues and agrees with the plan.    See Patient Instructions    ISAAC Bal CNP  [facility] OSIEL Cano is a 48 year old, presenting for the following health issues:  Establish Care and Results      History of Present Illness       Reason for visit:  Elevated bilirubin level    She eats 2-3 servings of fruits and vegetables daily.She consumes 0 sweetened beverage(s) daily.She exercises with enough effort to increase her heart rate 30 to 60 minutes per day.  She exercises with enough effort to increase her heart rate 4 days per week.   She is taking medications regularly.     No bowel pain after eating. More hormonal stool changes before her period. Had colonoscopy in July and it was normal.   Had elevated liver enzymes about 6 years ago, thought there  was some viral cause. Normalized and she got better. Thought maybe some gallblader stones at that time.     Review of Systems       Objective    /62   Pulse 65   Temp 98.4  F (36.9  C) (Temporal)   Resp 16   Wt 47.6 kg (105 lb)   SpO2 99%   BMI 20.51 kg/m    Body mass index is 20.51 kg/m .  Physical Exam   GENERAL: healthy, alert and no distress  RESP: lungs clear to auscultation - no rales, rhonchi or wheezes  CV: regular rate and rhythm, normal S1 S2, no S3 or S4, no murmur, click or rub, no peripheral edema and peripheral pulses strong  ABDOMEN: soft, nontender, no hepatosplenomegaly, no masses and bowel sounds normal  SKIN: no suspicious lesions or rashes  NEURO: Normal strength and tone, mentation intact and speech normal  PSYCH: mentation appears normal, affect normal/bright    No results found for any visits on 11/08/23.

## 2024-02-05 ENCOUNTER — PATIENT OUTREACH (OUTPATIENT)
Dept: CARE COORDINATION | Facility: CLINIC | Age: 49
End: 2024-02-05
Payer: COMMERCIAL

## 2024-02-19 ENCOUNTER — PATIENT OUTREACH (OUTPATIENT)
Dept: CARE COORDINATION | Facility: CLINIC | Age: 49
End: 2024-02-19

## 2024-03-04 ENCOUNTER — ANCILLARY PROCEDURE (OUTPATIENT)
Dept: MAMMOGRAPHY | Facility: CLINIC | Age: 49
End: 2024-03-04
Attending: OBSTETRICS & GYNECOLOGY
Payer: COMMERCIAL

## 2024-03-04 DIAGNOSIS — Z12.31 VISIT FOR SCREENING MAMMOGRAM: ICD-10-CM

## 2024-03-04 PROCEDURE — 77067 SCR MAMMO BI INCL CAD: CPT | Performed by: RADIOLOGY

## 2024-03-04 PROCEDURE — 77063 BREAST TOMOSYNTHESIS BI: CPT | Performed by: RADIOLOGY

## 2024-04-08 ENCOUNTER — OFFICE VISIT (OUTPATIENT)
Dept: OBGYN | Facility: OTHER | Age: 49
End: 2024-04-08
Payer: COMMERCIAL

## 2024-04-08 VITALS
DIASTOLIC BLOOD PRESSURE: 73 MMHG | SYSTOLIC BLOOD PRESSURE: 109 MMHG | HEIGHT: 60 IN | WEIGHT: 107.4 LBS | BODY MASS INDEX: 21.09 KG/M2

## 2024-04-08 DIAGNOSIS — F40.243 PHOBIA, FLYING: ICD-10-CM

## 2024-04-08 DIAGNOSIS — Z00.00 ANNUAL PHYSICAL EXAM: Primary | ICD-10-CM

## 2024-04-08 DIAGNOSIS — I83.812 VARICOSE VEINS OF LEFT LOWER EXTREMITY WITH PAIN: ICD-10-CM

## 2024-04-08 PROCEDURE — 99396 PREV VISIT EST AGE 40-64: CPT | Performed by: OBSTETRICS & GYNECOLOGY

## 2024-04-08 RX ORDER — ALPRAZOLAM 0.25 MG
0.25 TABLET ORAL 3 TIMES DAILY PRN
Qty: 30 TABLET | Refills: 0 | Status: SHIPPED | OUTPATIENT
Start: 2024-04-08

## 2024-04-08 NOTE — PATIENT INSTRUCTIONS
PREVENTIVE HEALTH RECOMMENDATIONS:   Get a physical every year.  A pap test is important to have done starting at age 21 and then every three years as long as your pap is normal.  When you receive the results of your pap test it will include when you need to have your next pap test.    You should be tested each year for chlamydia and gonorrhea if you are aged 16-25 and if you have had a new sexual partner since you were last tested.   Vaccines: Get a flu shot each year.   Eat at least 8-10 servings of fruits and vegetables daily.  Eat whole-grain bread and cereal, whole-wheat pasta and brown rice instead of white grains and white rice.   For bone health: Eat calcium-rich foods (dairy products) or take calcium pills (500 to 600 mg with vitamin D) twice a day with food.   Exercise for an average of 30 minutes a day, 5 days of the week. It can be as simple as taking a brisk walk.  This will help you control your weight and prevent many diseases.   Limit alcohol to one drink per day.   Don't smoke and limit your exposure to second hand smoke.  If you smoke consider making a plan to quit. Go to [employer] and clink on   [employer]  for help   Wear sunscreen with at least SPF15 to prevent skin damage and skin cancer.   Brush your teeth twice a day and floss once a day and see your dentist twice a year for an exam and cleaning.   Have a great year and I will look forward to seeing you next year.   Martha Garcia DO    If you have labs or imaging done, the results will automatically release in [employer] [facility] without an interpretation.  Your health care professional will review those results and send an interpretation with recommendations as soon as possible, but this may be 1-3 business days.    If you have any questions regarding your visit, please contact your care team.     [employer] [facility]: [phone]  Women s Health CLINIC HOURS TELEPHONE NUMBER       DO Reshma Blackman   MARILOU Escobar-MANUELA Lindsey-MANUELA Michel-MANUELA Mendoza-  Janna-     Monday- [place]  8:00 a.m - 5:00 p.m    Tuesday- [place]  8:00 a.m - 5:00 p.m    Wednesday- OFF    Thursday- Surgery     Friday- [place]  8:00 a.m - 5:00 p.m. [facility]  [address]  [place] [address]  [phone] Fax  Imaging [phone]    [facility]  [address] [place] [address] [place] [address]  [phone]    [address] [place] [address]  [phone] Fax  Imaging [phone]     Urgent Care locations:  [facility] Monday-Friday  10 am - 8 pm  Saturday and Sunday   9 am - 5 pm  Monday-Friday   10 am - 8 pm  Saturday and Sunday   9 am - 5 pm   [phone]     If you need a medication refill, please contact your pharmacy. Please allow 3 business days for your refill to be completed.    As always, thank you for trusting us with your healthcare needs!

## 2024-04-08 NOTE — PROGRESS NOTES
Chief Complaint   Patient presents with    Physical       Subjective  Rachael Elder is a 49 year old female who presents for her annual exam.  Patient states she is doing well.  Her menses have become somewhat irregular.  She may miss a menses, but then the next menses will be heavy with more cramps.  Her last menstrual period is now.  Patient uses pads and rarely has to change them.  No problems urinating.  Normal bowel movements.  Patient is sexually active.  No dyspareunia.  No vaginal spotting after.  3 NSVDs.  Her  has had a vasectomy.   Patient is going to be flying to [place] in July and is wanting a prescription for this.         Most recent pap:  2020  History of abnormal Pap smear:  No    Family history of uterine cancer:  No  Family history of ovarian cancer:  No  Family history of colon cancer:   No  Family history of breast cancer: No    ROS  ROS: 10 point ROS neg other than the symptoms noted above in the HPI.    Past Medical History:   Diagnosis Date    NO ACTIVE PROBLEMS      Past Surgical History:   Procedure Laterality Date    COLONOSCOPY WITH CO2 INSUFFLATION N/A 7/21/2023    Procedure: Colonoscopy with CO2 insufflation;  Surgeon: Ksenia Palomo MD;  Location: [facility] OR    LITHOTRIPSY  4/2009    LASER--Renal stone treaatment -- ?right     Family History   Problem Relation Age of Onset    Diabetes Mother     Lipids Mother     Cancer Mother         Lung    Asthma Son     Coronary Artery Disease No family hx of     Hyperlipidemia No family hx of     Breast Cancer No family hx of     Other Cancer No family hx of     Substance Abuse No family hx of     Osteoporosis No family hx of     Depression No family hx of     Mental Illness No family hx of     Colon Cancer No family hx of     Prostate Cancer No family hx of     Cerebrovascular Disease No family hx of     Hypertension No family hx of      Social History     Tobacco Use    Smoking status: Never    Smokeless tobacco: Never   Substance Use  Topics    Alcohol use: Yes     Comment: occ       Tobacco abuse:  No  Do you get at least three servings of calcium containing foods daily (dairy, green leafy vegetables, etc.)? yes   Outside of work or daily activities, how many days per week do you exercise for 30 minutes or longer? 3-4x per week  The patient does not drink >3 drinks per day nor >7 drinks per week.   Have you had an eye exam in the past two years? yes   Do you see a dentist twice per year? yes   Today's PHQ-2 Score:   Abuse: Current or Past(Physical, Sexual or Emotional)- No   Do you feel safe in your environment - Yes  Objective  Vitals: /73   Ht 1.524 m (5')   Wt 48.7 kg (107 lb 6.4 oz)   LMP 04/07/2024 (Exact Date)   BMI 20.98 kg/m    BMI= Body mass index is 20.98 kg/m .    General appearance=well developed, well-nourished female  Gait=normal  Psych=mood is stable, alert and oriented x3  HEENT=mucous membranes moist  Skin=no rashes or lesions seen,normal turgor   Breast:  Benign exam.  No masses noted.  Non tender. No skin changes, retraction, or nipple discharge.  Axilla feel completely normal, no lymph node enlargement and non-tender.  Neck=overall appearance is normal  Heart=RRR, no murmurs, no swelling noted  Thyroid=normal, no masses, no TTP, no enlargement  Lungs=non-labored breathing, no use of accessory muscles, clear to ausculation bilaterally  Abd=soft, Nontender/nondistended, +bowel sounds x4, no masses, no signs of hernias, no evidence of hepatosplenomegaly  PELVIC:    External genitalia: normal without lesions or masses  Urethral meatus: no lesions or prolapse noted, normal size  Urethra: no masses, non tender  Bladder: non tender, no fullness  Vagina: normal mucosa and rugae, no discharge, small amount of vaginal bleeding   Cervix: normal without lesion, no cervical motion tenderness, healthy, multiparous  Uterus: small, mobile, nontender.  Adnexa: non tender, without masses  Rectal: deferred  Ext=no clubbing or  cyanosis, no swelling      Last lipid profile:    Regular self breast exam: Yes  Most recent mammogram:    History of abnormal mammogram: No  Fit testin  DEXA:  N/A        Assessment/Plan  1.)  Annual/well woman exam  2.)  Fear of flying Xanax ordered  3.)  Varicose veins=patient is getting sclerotherapy for these      The following topics were discussed or recommended   Discussed seat belt, helmet and sunscreen use   Calcium/Vitamin D supplement=Recommended 1000 mg of calcium daily and 800 IU of vitamin D.          Martha Garcia DO

## 2025-03-10 ENCOUNTER — PATIENT OUTREACH (OUTPATIENT)
Dept: CARE COORDINATION | Facility: CLINIC | Age: 50
End: 2025-03-10
Payer: COMMERCIAL

## 2025-03-24 ENCOUNTER — PATIENT OUTREACH (OUTPATIENT)
Dept: CARE COORDINATION | Facility: CLINIC | Age: 50
End: 2025-03-24
Payer: COMMERCIAL

## 2025-04-15 ENCOUNTER — OFFICE VISIT (OUTPATIENT)
Dept: GASTROENTEROLOGY | Facility: CLINIC | Age: 50
End: 2025-04-15
Attending: OBSTETRICS & GYNECOLOGY
Payer: COMMERCIAL

## 2025-04-15 VITALS
HEART RATE: 69 BPM | DIASTOLIC BLOOD PRESSURE: 70 MMHG | OXYGEN SATURATION: 100 % | BODY MASS INDEX: 20.42 KG/M2 | WEIGHT: 104 LBS | HEIGHT: 60 IN | SYSTOLIC BLOOD PRESSURE: 110 MMHG

## 2025-04-15 DIAGNOSIS — R14.1 FLATULENCE, ERUCTATION AND GAS PAIN: ICD-10-CM

## 2025-04-15 DIAGNOSIS — R14.0 ABDOMINAL BLOATING: Primary | ICD-10-CM

## 2025-04-15 DIAGNOSIS — R14.2 FLATULENCE, ERUCTATION AND GAS PAIN: ICD-10-CM

## 2025-04-15 DIAGNOSIS — R79.89 HIGH DIRECT BILIRUBIN: ICD-10-CM

## 2025-04-15 DIAGNOSIS — R17 ELEVATED BILIRUBIN: ICD-10-CM

## 2025-04-15 DIAGNOSIS — R14.3 FLATULENCE, ERUCTATION AND GAS PAIN: ICD-10-CM

## 2025-04-15 LAB
ALBUMIN SERPL BCG-MCNC: 4.4 G/DL (ref 3.5–5.2)
ALP SERPL-CCNC: 73 U/L (ref 40–150)
ALT SERPL W P-5'-P-CCNC: 12 U/L (ref 0–50)
ANION GAP SERPL CALCULATED.3IONS-SCNC: 10 MMOL/L (ref 7–15)
AST SERPL W P-5'-P-CCNC: 27 U/L (ref 0–45)
BILIRUB DIRECT SERPL-MCNC: 0.4 MG/DL (ref 0–0.3)
BILIRUB SERPL-MCNC: 1.4 MG/DL
BILIRUB SERPL-MCNC: 1.5 MG/DL
BUN SERPL-MCNC: 10.5 MG/DL (ref 6–20)
CALCIUM SERPL-MCNC: 9.2 MG/DL (ref 8.8–10.4)
CHLORIDE SERPL-SCNC: 103 MMOL/L (ref 98–107)
CREAT SERPL-MCNC: 0.52 MG/DL (ref 0.51–0.95)
CRP SERPL-MCNC: <3 MG/L
EGFRCR SERPLBLD CKD-EPI 2021: >90 ML/MIN/1.73M2
ERYTHROCYTE [DISTWIDTH] IN BLOOD BY AUTOMATED COUNT: 12 % (ref 10–15)
GLUCOSE SERPL-MCNC: 103 MG/DL (ref 70–99)
HCO3 SERPL-SCNC: 26 MMOL/L (ref 22–29)
HCT VFR BLD AUTO: 42.4 % (ref 35–47)
HGB BLD-MCNC: 14.4 G/DL (ref 11.7–15.7)
MCH RBC QN AUTO: 29.3 PG (ref 26.5–33)
MCHC RBC AUTO-ENTMCNC: 34 G/DL (ref 31.5–36.5)
MCV RBC AUTO: 86 FL (ref 78–100)
PLATELET # BLD AUTO: 251 10E3/UL (ref 150–450)
POTASSIUM SERPL-SCNC: 4.6 MMOL/L (ref 3.4–5.3)
PROT SERPL-MCNC: 7.4 G/DL (ref 6.4–8.3)
RBC # BLD AUTO: 4.92 10E6/UL (ref 3.8–5.2)
RETICS # AUTO: 0.06 10E6/UL
RETICS/RBC NFR AUTO: 1.2 %
SODIUM SERPL-SCNC: 139 MMOL/L (ref 135–145)
WBC # BLD AUTO: 5.4 10E3/UL (ref 4–11)

## 2025-04-15 ASSESSMENT — PAIN SCALES - GENERAL: PAINLEVEL_OUTOF10: NO PAIN (0)

## 2025-04-15 NOTE — LETTER
4/15/2025      Rachael Elder  [address] [address] [address]      Dear Colleague,    Thank you for referring your patient, Rachael Elder, to the [facility]. Please see a copy of my visit note below.    NEW PATIENT GI CLINIC VISIT    CC/REFERRING MD:  Martha Garcia  REASON FOR CONSULTATION:   Rachael Elder is a 50 year old female who I was asked to see in consultation at the request of Dr. Martha Garcia for   Chief Complaint   Patient presents with     New Patient       ASSESSMENT/PLAN:  50 year old female with chronic post-prandial abdominal bloating and gas. She denies abdominal pain, N/V, weight loss, diarrhea, blood in the stool, or other alarm features. We discussed possible etiologies for her symptoms including IBS, malabsorptive process such as Celiac vs other, hepatobiliary pathology, gastritis, PUD, SIBO, less likely IBD, vs other. Will check some basic labs including CBC, CMP, Celiac serologies, CRP. Defer stool studies for now unless CRP returns elevated. She had a normal colonoscopy < 2 years ago. If labs return normal, we discussed management options including trial of low FODMAP diet, fiber supplementation, trial of OTC simethicone vs other pharmacotherapy. She is amenable and would like to proceed.    -Labs as denoted above.  -Consider trial of low FODMAP diet.  -Daily soluble fiber supplement.  -Follow up if no improvement or new/worsening symptoms develop.    Thank you for this consultation. It was a pleasure to participate in the care of this patient; please contact us with any further questions.  A total of 20 face-to-face minutes was spent with this patient, >50% of which was counseling regarding the above delineated issues. An additional 10 minutes was spent on the date of the encounter doing chart review, documentation, and further activities as noted above.    This note was created with voice recognition software, and while reviewed for  accuracy, typos may remain.     Tricia Conklin PA-C  Division of Gastroenterology, Hepatology and Nutrition  [facility]    ---------------------------------------------------------------------------------------------------  HPI:  Rachael Elder is a 50 year old female with no significant past medical history significant who presents for evaluation of abdominal bloating and gas. These issues have been ongoing for most of her life, slightly worsening in recent history which she possibly attributes to barbara-menopause. She reports abdominal bloating and excessive gurgling noises after she eats along with some gas. No abdominal pain, nausea, or vomiting. She does experience some early satiety at times so tries to eat small, frequent meals throughout the day. Her weight has been stable. She denies acid reflux or regurgitation. Has lactose intolerance so avoids dairy or takes Lactaid as needed. States that she will typically feel good 1 week out of the month with regard to her digestive issues.    In terms of bowel movements, she has 3 per day on average. These can range from formed to loose to rarely more constipated. She denies any blood in the stool and had a normal colonoscopy in July 2023.     Her mother has a history of IBS, but no other known family history of GI issues.     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    PREVIOUS ENDOSCOPY:  COLONOSCOPY 07/21/2023   Findings:       The digital rectal exam was normal. Pertinent negatives include no        palpable rectal lesions.        The entire examined colon appeared normal on direct and retroflexion        views.                                                                                    Impression:               - The entire examined colon is normal on direct and                             retroflexion views.                             - No specimens collected.     PROBLEM LIST  Patient  Active Problem List    Diagnosis Date Noted     Metrorrhagia 03/06/2023     Priority: Medium     Varicose veins of left lower extremity with pain 03/06/2023     Priority: Medium     Phobia, flying 10/03/2012     Priority: Medium       PERTINENT PAST MEDICAL HISTORY:  Past Medical History:   Diagnosis Date     NO ACTIVE PROBLEMS        PREVIOUS SURGERIES:  Past Surgical History:   Procedure Laterality Date     COLONOSCOPY WITH CO2 INSUFFLATION N/A 7/21/2023    Procedure: Colonoscopy with CO2 insufflation;  Surgeon: Ksenia Palomo MD;  Location: [facility] OR     LITHOTRIPSY  4/2009    LASER--Renal stone treaatment -- ?right       ALLERGIES:     Allergies   Allergen Reactions     Codeine Nausea and Vomiting     Macrobid [Nitrofurantoin Monohydrate Macrocrystals]      Rash, Hives     Nitrofurantoin Rash     Other reaction(s): Hives    Rash, Hives       PERTINENT MEDICATIONS:  Current Outpatient Medications   Medication Sig Dispense Refill     acetaminophen (TYLENOL) 325 MG tablet Take 1-2 tablets by mouth every 6 hours as needed       ALPRAZolam (XANAX) 0.25 MG tablet Take 1 tablet (0.25 mg) by mouth 3 times daily as needed for anxiety. 30 tablet 0     clonazePAM (KLONOPIN) 0.5 MG tablet Take 1 tablet (0.5 mg) by mouth 2 times daily as needed for anxiety 10 tablet 0     MULTI-DAY VITAMINS OR 1 TABLET DAILY       Probiotic Product (PROBIOTIC PO) Take  by mouth daily.       No current facility-administered medications for this visit.       SOCIAL HISTORY:  Social History     Socioeconomic History     Marital status:      Spouse name: Not on file     Number of children: 3     Years of education: Not on file     Highest education level: Not on file   Occupational History     Comment: Registered Dietitian   Tobacco Use     Smoking status: Never     Smokeless tobacco: Never   Vaping Use     Vaping status: Never Used   Substance and Sexual Activity     Alcohol use: Yes     Comment: occ     Drug use: No     Sexual activity:  Yes     Partners: Male     Birth control/protection: Male Surgical     Comment: vasectomy   Other Topics Concern     Parent/sibling w/ CABG, MI or angioplasty before 65F 55M? No   Social History Narrative    ENVIRONMENTAL HISTORY: [place] The home is heated with a forced air and gas furnace. They do have central air conditioning. The patient's bedroom is furnished with carpeting in bedroom.  Pets inside the house include 0 pets. There is no history of cockroach or mice infestation. There is/are 0 smokers in the house.  The house does not have a damp basement.      Social Drivers of Health     Financial Resource Strain: Low Risk  (11/6/2023)    Financial Resource Strain      Within the past 12 months, have you or your family members you live with been unable to get utilities (heat, electricity) when it was really needed?: No   Food Insecurity: Low Risk  (11/6/2023)    Food Insecurity      Within the past 12 months, did you worry that your food would run out before you got money to buy more?: No      Within the past 12 months, did the food you bought just not last and you didn t have money to get more?: No   Transportation Needs: Low Risk  (11/6/2023)    Transportation Needs      Within the past 12 months, has lack of transportation kept you from medical appointments, getting your medicines, non-medical meetings or appointments, work, or from getting things that you need?: No   Physical Activity: Not on file   Stress: Not on file   Social Connections: Not on file   Interpersonal Safety: Low Risk  (11/8/2023)    Interpersonal Safety      Do you feel physically and emotionally safe where you currently live?: Yes      Within the past 12 months, have you been hit, slapped, kicked or otherwise physically hurt by someone?: No      Within the past 12 months, have you been humiliated or emotionally abused in other ways by your partner or ex-partner?: No   Housing Stability: Low  Risk  (11/6/2023)    Housing Stability      Do you have housing? : Yes      Are you worried about losing your housing?: No       FAMILY HISTORY:  Family History   Problem Relation Age of Onset     Diabetes Mother      Lipids Mother      Cancer Mother         Lung     Asthma Son      Coronary Artery Disease No family hx of      Hyperlipidemia No family hx of      Breast Cancer No family hx of      Other Cancer No family hx of      Substance Abuse No family hx of      Osteoporosis No family hx of      Depression No family hx of      Mental Illness No family hx of      Colon Cancer No family hx of      Prostate Cancer No family hx of      Cerebrovascular Disease No family hx of      Hypertension No family hx of        Past/family/social history reviewed and no changes    PHYSICAL EXAMINATION:  Vitals /70   Pulse 69   Ht 1.524 m (5')   Wt 47.2 kg (104 lb)   LMP 02/24/2025 (Approximate)   SpO2 100%   BMI 20.31 kg/m     Wt   Wt Readings from Last 2 Encounters:   04/15/25 47.2 kg (104 lb)   03/14/25 47.4 kg (104 lb 6.4 oz)      Gen: Resting comfortably in an exam chair, alert, no distress  Eyes: sclerae anicteric  ENT:  OP clear, MMM  Resp: No increased respiratory effort  GI: soft, non-tender, non-distended. No organomegaly or masses palpated.  Skin: Visible skin clear. No significant rash, abnormal pigmentation or lesions.  Neuro: Cranial nerves grossly intact.  Mentation and speech appropriate for age.  Psych: Appropriate affect, tone, and pace of words    PERTINENT STUDIES:  Reviewed      Again, thank you for allowing me to participate in the care of your patient.        Sincerely,        Tricia Conklin PA-C    Electronically signed

## 2025-04-15 NOTE — PATIENT INSTRUCTIONS
It was a pleasure meeting with you today and discussing your healthcare plan. Below is a summary of what we covered:    Labs today. I will touch base via [facility] with results and next steps.    Consider looking into a low FODMAP diet plan, or modified low FODMAP diet.        Consider taking a daily soluble fiber supplement such as Citrucel or Metamucil. Citrucel may be less bloating.    You can try over the counter simethicone (GasX) for relief of gas, bloating, etc.    We can consider further testing as indicated if your symptoms fail to improve or worsen.       Please see below for any additional questions and scheduling guidelines.    Sign up for [facility]: [facility] patient portal serves as a secure platform for accessing your medical records from the [facility]. Additionally, [facility] facilitates easy, timely, and secure messaging with your care team. If you have not signed up, you may do so by using the provided code or calling [phone].    Coordinating your care after your visit:  There are multiple options for scheduling your follow-up care based on your provider's recommendation.    How do I schedule a follow-up clinic appointment:   After your appointment, you may receive scheduling assistance with the Clinic Coordinators by having a seat in the waiting room and a Clinic Coordinator will call you up to schedule.  Virtual visits or after you leave the clinic:  Your provider has placed a follow-up order in the [facility] portal for scheduling your return appointment. A member of the scheduling team will contact you to schedule.  [place] Scheduling: Timely scheduling through [facility] is advised to ensure appointment availability.   Call to schedule: You may schedule your follow-up appointment(s) by calling [phone], option 1.    How do I schedule my endoscopy or colonoscopy procedure:  If a procedure, such as a colonoscopy or upper endoscopy was ordered by your provider, the scheduling team will  contact you to schedule this procedure. Or you may choose to call to schedule at   [phone], option 2.  Please allow 20-30 minutes when scheduling a procedure.    How do I get my blood work done? To get your blood work done, you need to schedule a lab appointment at an [facility]. There are multiple ways to schedule:   At the clinic: The Clinic Coordinator you meet after your visit can help you schedule a lab appointment.   [employer] scheduling: [employer] offers online lab scheduling at all [facility] laboratory locations.   Call to schedule: You can call [phone] to schedule your lab appointment.    How do I schedule my imaging study: To schedule imaging studies, such as CT scans, ultrasounds, MRIs, or X-rays, contact Imaging Services at [phone].    How do I schedule a referral to another doctor: If your provider recommended a referral to another specialist(s), the referral order was placed by your provider. You will receive a phone call to schedule this referral, or you may choose to call the number attached to the referral to self-schedule.    For Post-Visit Question(s):  For any inquiries following today's visit:  Please utilize [employer] messaging and allow 48 hours for reply or contact the Call Center during normal business hours at [phone], option 3.  For Emergent After-hours questions, contact the On-Call GI Fellow through the [facility]  at [phone].  In addition, you may contact your Nurse directly using the provided contact information.    Test Results:  Test results will be accessible via [employer] in compliance with the 21st Century Cures Act. This means that your results will be available to you at the same time as your provider. Often you may see your results before your provider does. Results are reviewed by staff within two weeks with communication follow-up. Results may be released in the patient portal prior to your care team  review.    Prescription Refill(s):  Medication prescribed by your provider will be addressed during your visit. For future refills, please coordinate with your pharmacy. If you have not had a recent clinic visit or routine labs, for your safety, your provider may not be able to refill your prescription.

## 2025-04-15 NOTE — NURSING NOTE
Chief Complaint   Patient presents with    New Patient     She requests these members of her care team be copied on today's visit information:  PCP: Charleen, [facility]    Referring Provider:  Martha Garcia   [address]  [place] [address]    Vitals:    04/15/25 0711   BP: 110/70   Pulse: 69   SpO2: 100%   Weight: 47.2 kg (104 lb)   Height: 1.524 m (5')     Body mass index is 20.31 kg/m .    Do you have a history of colon cancer in your immediate family? NO    Medications were reconciled.    Nat Snowden, Clinical Assistant

## 2025-04-15 NOTE — PROGRESS NOTES
NEW PATIENT GI CLINIC VISIT    CC/REFERRING MD:  Martha Garcia  REASON FOR CONSULTATION:   Rachael Elder is a 50 year old female who I was asked to see in consultation at the request of Dr. Martha Garcia for   Chief Complaint   Patient presents with    New Patient       ASSESSMENT/PLAN:  50 year old female with chronic post-prandial abdominal bloating and gas. She denies abdominal pain, N/V, weight loss, diarrhea, blood in the stool, or other alarm features. We discussed possible etiologies for her symptoms including IBS, malabsorptive process such as Celiac vs other, hepatobiliary pathology, gastritis, PUD, SIBO, less likely IBD, vs other. Will check some basic labs including CBC, CMP, Celiac serologies, CRP. Defer stool studies for now unless CRP returns elevated. She had a normal colonoscopy < 2 years ago. If labs return normal, we discussed management options including trial of low FODMAP diet, fiber supplementation, trial of OTC simethicone vs other pharmacotherapy. She is amenable and would like to proceed.    -Labs as denoted above.  -Consider trial of low FODMAP diet.  -Daily soluble fiber supplement.  -Follow up if no improvement or new/worsening symptoms develop.    Thank you for this consultation. It was a pleasure to participate in the care of this patient; please contact us with any further questions.  A total of 20 face-to-face minutes was spent with this patient, >50% of which was counseling regarding the above delineated issues. An additional 10 minutes was spent on the date of the encounter doing chart review, documentation, and further activities as noted above.    This note was created with voice recognition software, and while reviewed for accuracy, typos may remain.     Tricia Conklin PA-C  Division of Gastroenterology, Hepatology and Nutrition  [facility]  [place]    ---------------------------------------------------------------------------------------------------  HPI:  Rachael Elder is a 50 year old female with no significant past medical history significant who presents for evaluation of abdominal bloating and gas. These issues have been ongoing for most of her life, slightly worsening in recent history which she possibly attributes to barbara-menopause. She reports abdominal bloating and excessive gurgling noises after she eats along with some gas. No abdominal pain, nausea, or vomiting. She does experience some early satiety at times so tries to eat small, frequent meals throughout the day. Her weight has been stable. She denies acid reflux or regurgitation. Has lactose intolerance so avoids dairy or takes Lactaid as needed. States that she will typically feel good 1 week out of the month with regard to her digestive issues.    In terms of bowel movements, she has 3 per day on average. These can range from formed to loose to rarely more constipated. She denies any blood in the stool and had a normal colonoscopy in July 2023.     Her mother has a history of IBS, but no other known family history of GI issues.     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    PREVIOUS ENDOSCOPY:  COLONOSCOPY 07/21/2023   Findings:       The digital rectal exam was normal. Pertinent negatives include no        palpable rectal lesions.        The entire examined colon appeared normal on direct and retroflexion        views.                                                                                    Impression:               - The entire examined colon is normal on direct and                             retroflexion views.                             - No specimens collected.     PROBLEM LIST  Patient Active Problem List    Diagnosis Date Noted    Metrorrhagia 03/06/2023     Priority: Medium    Varicose veins of left lower extremity with pain 03/06/2023      Priority: Medium    Phobia, flying 10/03/2012     Priority: Medium       PERTINENT PAST MEDICAL HISTORY:  Past Medical History:   Diagnosis Date    NO ACTIVE PROBLEMS        PREVIOUS SURGERIES:  Past Surgical History:   Procedure Laterality Date    COLONOSCOPY WITH CO2 INSUFFLATION N/A 7/21/2023    Procedure: Colonoscopy with CO2 insufflation;  Surgeon: Ksenia Palomo MD;  Location: [facility] OR    LITHOTRIPSY  4/2009    LASER--Renal stone treaatment -- ?right       ALLERGIES:     Allergies   Allergen Reactions    Codeine Nausea and Vomiting    Macrobid [Nitrofurantoin Monohydrate Macrocrystals]      Rash, Hives    Nitrofurantoin Rash     Other reaction(s): Hives    Rash, Hives       PERTINENT MEDICATIONS:  Current Outpatient Medications   Medication Sig Dispense Refill    acetaminophen (TYLENOL) 325 MG tablet Take 1-2 tablets by mouth every 6 hours as needed      ALPRAZolam (XANAX) 0.25 MG tablet Take 1 tablet (0.25 mg) by mouth 3 times daily as needed for anxiety. 30 tablet 0    clonazePAM (KLONOPIN) 0.5 MG tablet Take 1 tablet (0.5 mg) by mouth 2 times daily as needed for anxiety 10 tablet 0    MULTI-DAY VITAMINS OR 1 TABLET DAILY      Probiotic Product (PROBIOTIC PO) Take  by mouth daily.       No current facility-administered medications for this visit.       SOCIAL HISTORY:  Social History     Socioeconomic History    Marital status:      Spouse name: Not on file    Number of children: 3    Years of education: Not on file    Highest education level: Not on file   Occupational History     Comment: Registered Dietitian   Tobacco Use    Smoking status: Never    Smokeless tobacco: Never   Vaping Use    Vaping status: Never Used   Substance and Sexual Activity    Alcohol use: Yes     Comment: occ    Drug use: No    Sexual activity: Yes     Partners: Male     Birth control/protection: Male Surgical     Comment: vasectomy   Other Topics Concern    Parent/sibling w/ CABG, MI or angioplasty before 65F 55M? No    Social History Narrative    ENVIRONMENTAL HISTORY: [place] The home is heated with a forced air and gas furnace. They do have central air conditioning. The patient's bedroom is furnished with carpeting in bedroom.  Pets inside the house include 0 pets. There is no history of cockroach or mice infestation. There is/are 0 smokers in the house.  The house does not have a damp basement.      Social Drivers of Health     Financial Resource Strain: Low Risk  (11/6/2023)    Financial Resource Strain     Within the past 12 months, have you or your family members you live with been unable to get utilities (heat, electricity) when it was really needed?: No   Food Insecurity: Low Risk  (11/6/2023)    Food Insecurity     Within the past 12 months, did you worry that your food would run out before you got money to buy more?: No     Within the past 12 months, did the food you bought just not last and you didn t have money to get more?: No   Transportation Needs: Low Risk  (11/6/2023)    Transportation Needs     Within the past 12 months, has lack of transportation kept you from medical appointments, getting your medicines, non-medical meetings or appointments, work, or from getting things that you need?: No   Physical Activity: Not on file   Stress: Not on file   Social Connections: Not on file   Interpersonal Safety: Low Risk  (11/8/2023)    Interpersonal Safety     Do you feel physically and emotionally safe where you currently live?: Yes     Within the past 12 months, have you been hit, slapped, kicked or otherwise physically hurt by someone?: No     Within the past 12 months, have you been humiliated or emotionally abused in other ways by your partner or ex-partner?: No   Housing Stability: Low Risk  (11/6/2023)    Housing Stability     Do you have housing? : Yes     Are you worried about losing your housing?: No       FAMILY HISTORY:  Family History   Problem Relation Age of  Onset    Diabetes Mother     Lipids Mother     Cancer Mother         Lung    Asthma Son     Coronary Artery Disease No family hx of     Hyperlipidemia No family hx of     Breast Cancer No family hx of     Other Cancer No family hx of     Substance Abuse No family hx of     Osteoporosis No family hx of     Depression No family hx of     Mental Illness No family hx of     Colon Cancer No family hx of     Prostate Cancer No family hx of     Cerebrovascular Disease No family hx of     Hypertension No family hx of        Past/family/social history reviewed and no changes    PHYSICAL EXAMINATION:  Vitals /70   Pulse 69   Ht 1.524 m (5')   Wt 47.2 kg (104 lb)   LMP 02/24/2025 (Approximate)   SpO2 100%   BMI 20.31 kg/m     Wt   Wt Readings from Last 2 Encounters:   04/15/25 47.2 kg (104 lb)   03/14/25 47.4 kg (104 lb 6.4 oz)      Gen: Resting comfortably in an exam chair, alert, no distress  Eyes: sclerae anicteric  ENT:  OP clear, MMM  Resp: No increased respiratory effort  GI: soft, non-tender, non-distended. No organomegaly or masses palpated.  Skin: Visible skin clear. No significant rash, abnormal pigmentation or lesions.  Neuro: Cranial nerves grossly intact.  Mentation and speech appropriate for age.  Psych: Appropriate affect, tone, and pace of words    PERTINENT STUDIES:  Reviewed

## 2025-04-16 LAB
HAPTOGLOB SERPL-MCNC: 59 MG/DL (ref 30–200)
IGA SERPL-MCNC: 257 MG/DL (ref 84–499)
TTG IGA SER-ACNC: 0.8 U/ML
TTG IGG SER-ACNC: <0.6 U/ML

## (undated) DEVICE — SOL WATER IRRIG 1000ML BOTTLE 07139-09

## (undated) RX ORDER — FENTANYL CITRATE 50 UG/ML
INJECTION, SOLUTION INTRAMUSCULAR; INTRAVENOUS
Status: DISPENSED
Start: 2023-07-21

## (undated) RX ORDER — ONDANSETRON 2 MG/ML
INJECTION INTRAMUSCULAR; INTRAVENOUS
Status: DISPENSED
Start: 2023-07-21